# Patient Record
Sex: FEMALE | Race: WHITE | NOT HISPANIC OR LATINO | Employment: FULL TIME | URBAN - METROPOLITAN AREA
[De-identification: names, ages, dates, MRNs, and addresses within clinical notes are randomized per-mention and may not be internally consistent; named-entity substitution may affect disease eponyms.]

---

## 2017-05-27 ENCOUNTER — TRANSFERRED RECORDS (OUTPATIENT)
Dept: HEALTH INFORMATION MANAGEMENT | Facility: CLINIC | Age: 42
End: 2017-05-27

## 2017-09-18 ENCOUNTER — OFFICE VISIT (OUTPATIENT)
Dept: FAMILY MEDICINE | Facility: CLINIC | Age: 42
End: 2017-09-18
Payer: COMMERCIAL

## 2017-09-18 VITALS
BODY MASS INDEX: 23.98 KG/M2 | TEMPERATURE: 98.7 F | SYSTOLIC BLOOD PRESSURE: 128 MMHG | HEART RATE: 68 BPM | WEIGHT: 148.6 LBS | DIASTOLIC BLOOD PRESSURE: 78 MMHG | RESPIRATION RATE: 20 BRPM

## 2017-09-18 DIAGNOSIS — G43.909 MIGRAINE WITHOUT STATUS MIGRAINOSUS, NOT INTRACTABLE, UNSPECIFIED MIGRAINE TYPE: ICD-10-CM

## 2017-09-18 DIAGNOSIS — M54.12 CERVICAL RADICULAR PAIN: Primary | ICD-10-CM

## 2017-09-18 PROCEDURE — 99214 OFFICE O/P EST MOD 30 MIN: CPT | Performed by: FAMILY MEDICINE

## 2017-09-18 RX ORDER — PREDNISONE 20 MG/1
TABLET ORAL
Qty: 8 TABLET | Refills: 0 | Status: SHIPPED | OUTPATIENT
Start: 2017-09-18 | End: 2018-01-29

## 2017-09-18 RX ORDER — GABAPENTIN 100 MG/1
200 CAPSULE ORAL 3 TIMES DAILY
Refills: 0 | COMMUNITY
Start: 2017-08-31 | End: 2017-09-18

## 2017-09-18 RX ORDER — SUMATRIPTAN 50 MG/1
50 TABLET, FILM COATED ORAL
Qty: 30 TABLET | Refills: 3 | Status: SHIPPED | OUTPATIENT
Start: 2017-09-18 | End: 2018-01-24

## 2017-09-18 RX ORDER — SUMATRIPTAN 50 MG/1
50 TABLET, FILM COATED ORAL PRN
COMMUNITY
Start: 2016-08-25 | End: 2017-09-18

## 2017-09-18 RX ORDER — GABAPENTIN 400 MG/1
400 CAPSULE ORAL 3 TIMES DAILY
Qty: 90 CAPSULE | Refills: 1 | Status: SHIPPED | OUTPATIENT
Start: 2017-09-18 | End: 2017-11-22

## 2017-09-18 RX ORDER — HYDROXYZINE HYDROCHLORIDE 25 MG/1
TABLET, FILM COATED ORAL
Qty: 30 TABLET | Refills: 1 | Status: SHIPPED | OUTPATIENT
Start: 2017-09-18 | End: 2018-03-07

## 2017-09-18 NOTE — PROGRESS NOTES
SUBJECTIVE:   Ariadne Cooper is a 42 year old female who presents to clinic today for the following health issues:    Joint Pain    Onset: for a while    Description:   Location: Bilateral elbows down to hands   Character: Sharp and Dull ache    Intensity: moderate    Progression of Symptoms: worse    Accompanying Signs & Symptoms:  Other symptoms: numbness and tingling    History:   Previous similar pain: YES      Precipitating factors:   Trauma or overuse: no   She can't open doors or lift things without having pain.      Alleviating factors:  Improved by: nothing    Therapies Tried and outcome: Gabapentin has been helping until the last 2 weeks           Migraine Follow-Up    Headaches symptoms:  Worsened, is on day 3-4     Frequency:  Not too often     Duration of headaches: continuous    Able to do normal daily activities/work with migraines: No - has to modify daily activities    Rescue/Relief medication:None               Effectiveness: no relief    Preventative medication: none     Neurologic complications: No new stroke-like symptoms, loss of vision or speech, numbness or weakness    In the past 4 weeks, how often have you gone to Urgent Care or the emergency room because of your headaches?  0    Was given Imitrex though has used the last 3 days and not working, now Rx has .     S: Ariadne Coopre is a 42 year old female with chronic bilateral lower arm and hand pain, which she has been told by her spine surgeon at  [facility] comes from her neck.  One MRI within past 12 months, need to  Get record.  Non surgical, apparently.  On 200mg tid gabapentin.  Was working, but last few weeks arm/hand pain returned.  Apparently was worked up as carpal tunnel, normal EMG at wrist, then MRI neck with significant findings.    No new or recent injury to explain worsening of sx.     Migraines: neck pain has made worse.  Imitrex not working, was taking regularly over last few days, was unaware of mainly preventive  benefit.  On voltaren 50mg bid chronically for months as well    No fever, no trauma, no rash, no LE sx. Upper arms still sx free.     Problem list, med list, additional histories reviewed and updated, as indicated.      O:/78 (BP Location: Left arm, Cuff Size: Adult Regular)  Pulse 68  Temp 98.7  F (37.1  C) (Tympanic)  Resp 20  Wt 148 lb 9.6 oz (67.4 kg)  BMI 23.98 kg/m2  GEN: Alert and oriented, in no acute distress  Weaker hands, not severe.  Normal above elbows.  No sx with ROM neck.  Neck ok to palpation    A: migraine , persistent, worse       Bilateral cervical radiculopathy, worse    P: up to 400mg on gabapentin tid.  Add some vistaril prn for HA trx.  Prednisone for neck and migraine help.  Fills on imitrex, clarified how to use that.  See how all this goes.  Signed for records on neck from  ortho.      F/u depends on response.  Try to get her off chronic nsaids in future as well.  Probably not helping migraine status, and puts her at risk for complications GI/renal long term.

## 2017-09-18 NOTE — NURSING NOTE
"Chief Complaint   Patient presents with     Musculoskeletal Problem     arm and hand pain from neck discs      Headache       Initial /78 (BP Location: Left arm, Cuff Size: Adult Regular)  Pulse 68  Temp 98.7  F (37.1  C) (Tympanic)  Resp 20  Wt 148 lb 9.6 oz (67.4 kg)  BMI 23.98 kg/m2 Estimated body mass index is 23.98 kg/(m^2) as calculated from the following:    Height as of 11/9/16: 5' 6\" (1.676 m).    Weight as of this encounter: 148 lb 9.6 oz (67.4 kg).  Medication Reconciliation: complete    Health Maintenance that is potentially due pending provider review:  Pap Smear and tetanus.  Patient reported she had a flu shot at work 9/11/217.    Pt will schedule future appt.    Is there anyone who you would like to be able to receive your results? Not Applicable  If yes have patient fill out LORI  Radha Cm M.A.        "

## 2017-09-18 NOTE — MR AVS SNAPSHOT
"              After Visit Summary   2017    Ariadne Cooper    MRN: [id number]           Patient Information     Date Of Birth          1975        Visit Information        Provider Department      2017 4:20 PM Young Hoskins MD [facility]        Today's Diagnoses     Cervical radicular pain    -  1    Migraine without status migrainosus, not intractable, unspecified migraine type           Follow-ups after your visit        Who to contact     If you have questions or need follow up information about today's clinic visit or your schedule please contact [facility] directly at [phone].  Normal or non-critical lab and imaging results will be communicated to you by MyChart, letter or phone within 4 business days after the clinic has received the results. If you do not hear from us within 7 days, please contact the clinic through itembasehart or phone. If you have a critical or abnormal lab result, we will notify you by phone as soon as possible.  Submit refill requests through Music[employer] or call your pharmacy and they will forward the refill request to us. Please allow 3 business days for your refill to be completed.          Additional Information About Your Visit        MyChart Information     Musicane lets you send messages to your doctor, view your test results, renew your prescriptions, schedule appointments and more. To sign up, go to [email or web address] . Click on \"Log in\" on the left side of the screen, which will take you to the Welcome page. Then click on \"Sign up Now\" on the right side of the page.     You will be asked to enter the access code listed below, as well as some personal information. Please follow the directions to create your username and password.     Your access code is: [email or web address]  Expires: 2017  5:26 PM     Your access code will  in 90 days. If you need help or a new code, please call your [facility] or [phone].      "   Care EveryWhere ID     This is your Care EveryWhere ID. This could be used by other organizations to access your [place] medical records  [id number]        Your Vitals Were     Pulse Temperature Respirations BMI (Body Mass Index)          68 98.7  F (37.1  C) (Tympanic) 20 23.98 kg/m2         Blood Pressure from Last 3 Encounters:   09/18/17 128/78   11/09/16 132/88    Weight from Last 3 Encounters:   09/18/17 148 lb 9.6 oz (67.4 kg)   11/09/16 140 lb (63.5 kg)              Today, you had the following     No orders found for display         Today's Medication Changes          These changes are accurate as of: 9/18/17  5:26 PM.  If you have any questions, ask your nurse or doctor.               Start taking these medicines.        Dose/Directions    hydrOXYzine 25 MG tablet   Commonly known as:  ATARAX   Used for:  Migraine without status migrainosus, not intractable, unspecified migraine type   Started by:  Young Hoskins MD        1-2 tabs 3x/day for migraine as needed   Quantity:  30 tablet   Refills:  1       predniSONE 20 MG tablet   Commonly known as:  DELTASONE   Used for:  Cervical radicular pain, Migraine without status migrainosus, not intractable, unspecified migraine type   Started by:  Young Hoskins MD        2 daily for 3 days, one daily for 2 days then stop   Quantity:  8 tablet   Refills:  0         These medicines have changed or have updated prescriptions.        Dose/Directions    gabapentin 400 MG capsule   Commonly known as:  NEURONTIN   This may have changed:    - medication strength  - how much to take   Used for:  Cervical radicular pain   Changed by:  Young Hoskins MD        Dose:  400 mg   Take 1 capsule (400 mg) by mouth 3 times daily   Quantity:  90 capsule   Refills:  1       SUMAtriptan 50 MG tablet   Commonly known as:  IMITREX   This may have changed:    - when to take this  - additional instructions   Used for:  Migraine without status migrainosus, not intractable,  unspecified migraine type   Changed by:  Young Hoskins MD        Dose:  50 mg   Take 1 tablet (50 mg) by mouth at onset of headache May repeat in 1 hour if needed   Quantity:  30 tablet   Refills:  3            Where to get your medicines      These medications were sent to [facility] [address] [place], [place] - [address] [facility] AT [facility] [address]  [address] [facility] [address]     Phone:  [phone]     gabapentin 400 MG capsule    hydrOXYzine 25 MG tablet    predniSONE 20 MG tablet    SUMAtriptan 50 MG tablet                Primary Care Provider Office Phone # Fax #    Young Hoskins [phone] [address] [facility] [address]        Equal Access to Services     THANG SIMON : Hadii victorina zuleta hadasho Soomaali, waaxda luqadaha, qaybta kaalmada adeegyada, waxay idiin hayradhan paco puckett. So [facility] [phone].    ATENCIÓN: Si habla español, tiene a mckenzie disposición servicios gratuitos de asistencia lingüística. [facility] [phone].    We comply with applicable federal civil rights laws and [place] laws. We do not discriminate on the basis of race, color, national origin, age, disability sex, sexual orientation or gender identity.            Thank you!     Thank you for choosing [facility]  for your care. Our goal is always to provide you with excellent care. Hearing back from our patients is one way we can continue to improve our services. Please take a few minutes to complete the written survey that you may receive in the mail after your visit with us. Thank you!             Your Updated Medication List - Protect others around you: Learn how to safely use, store and throw away your medicines at www.disposemymeds.org.          This list is accurate as of: 9/18/17  5:26 PM.  Always use your most recent med list.                   Brand Name Dispense Instructions for use Diagnosis    diclofenac 50 MG EC tablet    VOLTAREN      Take 50 mg by mouth 2 times daily        gabapentin 400 MG capsule    NEURONTIN    90 capsule    Take 1 capsule (400 mg) by mouth 3 times daily    Cervical radicular pain       hydrOXYzine 25 MG tablet    ATARAX    30 tablet    1-2 tabs 3x/day for migraine as needed    Migraine without status migrainosus, not intractable, unspecified migraine type       NORCO 5-325 MG per tablet   Generic drug:  HYDROcodone-acetaminophen      Take 1 tablet by mouth every 6 hours as needed for moderate to severe pain    Throat pain       predniSONE 20 MG tablet    DELTASONE    8 tablet    2 daily for 3 days, one daily for 2 days then stop    Cervical radicular pain, Migraine without status migrainosus, not intractable, unspecified migraine type       SUMAtriptan 50 MG tablet    IMITREX    30 tablet    Take 1 tablet (50 mg) by mouth at onset of headache May repeat in 1 hour if needed    Migraine without status migrainosus, not intractable, unspecified migraine type

## 2017-11-22 DIAGNOSIS — M54.12 CERVICAL RADICULAR PAIN: ICD-10-CM

## 2017-11-22 RX ORDER — GABAPENTIN 400 MG/1
400 CAPSULE ORAL 3 TIMES DAILY
Qty: 90 CAPSULE | Refills: 11 | Status: SHIPPED | OUTPATIENT
Start: 2017-11-22 | End: 2018-01-29 | Stop reason: DRUGHIGH

## 2017-11-22 NOTE — TELEPHONE ENCOUNTER
Gabapentin 400mg      Last Written Prescription Date: 09.18.2017  Last Fill Quantity: 90,  # refills: 1   Last Office Visit with G, P or [facility] prescribing provider: 09.18.2017    Yaritza Olivia CSS

## 2017-12-03 ENCOUNTER — HEALTH MAINTENANCE LETTER (OUTPATIENT)
Age: 42
End: 2017-12-03

## 2018-01-24 DIAGNOSIS — G43.909 MIGRAINE WITHOUT STATUS MIGRAINOSUS, NOT INTRACTABLE, UNSPECIFIED MIGRAINE TYPE: ICD-10-CM

## 2018-01-24 RX ORDER — SUMATRIPTAN 50 MG/1
50 TABLET, FILM COATED ORAL
Qty: 18 TABLET | Refills: 3 | Status: SHIPPED | OUTPATIENT
Start: 2018-01-24 | End: 2018-05-03

## 2018-01-24 NOTE — TELEPHONE ENCOUNTER
Please put note to pharmacy on Rx that we have her last name as Kenneth, Pharmacy request was under the name of Ariadne Lowry.    Requested Prescriptions   Pending Prescriptions Disp Refills     SUMAtriptan (IMITREX) 50 MG tablet 30 tablet 3     Sig: Take 1 tablet (50 mg) by mouth at onset of headache May repeat in 1 hour if needed    There is no refill protocol information for this order        Last Written Prescription Date:  9/18/17  Last Fill Quantity: 30,  # refills: 3   Last Office Visit with [facility], [facility] or [facility] prescribing provider:  9/18/17   Future Office Visit:

## 2018-01-29 ENCOUNTER — OFFICE VISIT (OUTPATIENT)
Dept: FAMILY MEDICINE | Facility: CLINIC | Age: 43
End: 2018-01-29
Payer: COMMERCIAL

## 2018-01-29 VITALS
BODY MASS INDEX: 24.64 KG/M2 | WEIGHT: 157 LBS | TEMPERATURE: 98.4 F | DIASTOLIC BLOOD PRESSURE: 76 MMHG | HEIGHT: 67 IN | SYSTOLIC BLOOD PRESSURE: 134 MMHG | HEART RATE: 85 BPM

## 2018-01-29 DIAGNOSIS — G62.9 NEUROPATHY: ICD-10-CM

## 2018-01-29 DIAGNOSIS — M54.12 CERVICAL RADICULAR PAIN: Primary | ICD-10-CM

## 2018-01-29 PROCEDURE — 36415 COLL VENOUS BLD VENIPUNCTURE: CPT | Performed by: NURSE PRACTITIONER

## 2018-01-29 PROCEDURE — 80053 COMPREHEN METABOLIC PANEL: CPT | Performed by: NURSE PRACTITIONER

## 2018-01-29 PROCEDURE — 99214 OFFICE O/P EST MOD 30 MIN: CPT | Performed by: NURSE PRACTITIONER

## 2018-01-29 RX ORDER — TRAMADOL HYDROCHLORIDE 50 MG/1
50-100 TABLET ORAL EVERY 6 HOURS PRN
Qty: 30 TABLET | Refills: 0 | Status: SHIPPED | OUTPATIENT
Start: 2018-01-29 | End: 2018-02-14

## 2018-01-29 RX ORDER — GABAPENTIN 300 MG/1
600 CAPSULE ORAL 3 TIMES DAILY
Qty: 60 CAPSULE | Refills: 3 | Status: SHIPPED | OUTPATIENT
Start: 2018-01-29 | End: 2018-03-12

## 2018-01-29 ASSESSMENT — PAIN SCALES - GENERAL: PAINLEVEL: SEVERE PAIN (6)

## 2018-01-29 NOTE — MR AVS SNAPSHOT
After Visit Summary   1/29/2018    Araidne Lowry    MRN: [id number]           Patient Information     Date Of Birth          1975        Visit Information        Provider Department      1/29/2018 4:40 PM Emily Campoverde APRN [facility]        Today's Diagnoses     Cervical radicular pain    -  1    Neuropathy          Care Instructions    Increase your Gabapentin to 600 mg three times daily.    Ultram is ordered  mg every 6 hours a day, maximum of 4 tablets per day    Referrals are in place for MRI and Orthopedics          Follow-ups after your visit        Additional Services     ORTHO  REFERRAL       [facility] is referring you to the Orthopedic  Services at [facility].       The  Representative will assist you in the coordination of your Orthopedic and Musculoskeletal Care as prescribed by your physician.    The  Representative will call you within 1 business day to help schedule your appointment, or you may contact the  Representative at:    All areas ~ [phone] Patient would like [facility] Referral     Type of Referral : Spine: Cervical / Thoracic: Cervical / Thoracic Spine Surgeon        Timeframe requested: after MRI    Coverage of these services is subject to the terms and limitations of your health insurance plan.  Please call member services at your health plan with any benefit or coverage questions.      If X-rays, CT or MRI's have been performed, please contact the facility where they were done to arrange for , prior to your scheduled appointment.  Please bring this referral request to your appointment and present it to your specialist.                  Future tests that were ordered for you today     Open Future Orders        Priority Expected Expires Ordered    MR Cervical Spine w/o Contrast Routine  1/29/2019 1/29/2018            Who to contact     If you  "have questions or need follow up information about today's clinic visit or your schedule please contact [facility] directly at [phone].  Normal or non-critical lab and imaging results will be communicated to you by MyChart, letter or phone within 4 business days after the clinic has received the results. If you do not hear from us within 7 days, please contact the clinic through [employer] or phone. If you have a critical or abnormal lab result, we will notify you by phone as soon as possible.  Submit refill requests through Deskarma or call your pharmacy and they will forward the refill request to us. Please allow 3 business days for your refill to be completed.          Additional Information About Your Visit        [employer] Information     Deskarma lets you send messages to your doctor, view your test results, renew your prescriptions, schedule appointments and more. To sign up, go to [email or web address] . Click on \"Log in\" on the left side of the screen, which will take you to the Welcome page. Then click on \"Sign up Now\" on the right side of the page.     You will be asked to enter the access code listed below, as well as some personal information. Please follow the directions to create your username and password.     Your access code is: [email or web address]  Expires: 2018  5:58 PM     Your access code will  in 90 days. If you need help or a new code, please call your [place] clinic or [phone].        Care EveryWhere ID     This is your Care EveryWhere ID. This could be used by other organizations to access your [place] medical records  [id number]        Your Vitals Were     Pulse Temperature Height Last Period Breastfeeding? BMI (Body Mass Index)    85 98.4  F (36.9  C) (Tympanic) 5' 6.75\" (1.695 m) 01/15/2018 No 24.77 kg/m2       Blood Pressure from Last 3 Encounters:   18 134/76   17 128/78   16 132/88    Weight from Last 3 Encounters:   18 157 lb (71.2 " kg)   09/18/17 148 lb 9.6 oz (67.4 kg)   11/09/16 140 lb (63.5 kg)              We Performed the Following     ORTHO  REFERRAL          Today's Medication Changes          These changes are accurate as of 1/29/18  5:58 PM.  If you have any questions, ask your nurse or doctor.               Start taking these medicines.        Dose/Directions    traMADol 50 MG tablet   Commonly known as:  ULTRAM   Used for:  Cervical radicular pain   Started by:  Emily Camopverde APRN CNP        Dose:   mg   Take 1-2 tablets ( mg) by mouth every 6 hours as needed for pain maximum 4 tablet(s) per day   Quantity:  30 tablet   Refills:  0         These medicines have changed or have updated prescriptions.        Dose/Directions    * gabapentin 400 MG capsule   Commonly known as:  NEURONTIN   This may have changed:  Another medication with the same name was added. Make sure you understand how and when to take each.   Used for:  Cervical radicular pain   Changed by:  Emily Campoverde APRN CNP        Dose:  400 mg   Take 1 capsule (400 mg) by mouth 3 times daily   Quantity:  90 capsule   Refills:  11       * gabapentin 300 MG capsule   Commonly known as:  NEURONTIN   This may have changed:  You were already taking a medication with the same name, and this prescription was added. Make sure you understand how and when to take each.   Used for:  Neuropathy   Changed by:  Emily Campoverde APRN CNP        Dose:  600 mg   Take 2 capsules (600 mg) by mouth 3 times daily   Quantity:  60 capsule   Refills:  3       * Notice:  This list has 2 medication(s) that are the same as other medications prescribed for you. Read the directions carefully, and ask your doctor or other care provider to review them with you.         Where to get your medicines      These medications were sent to [facility] [address] [place] [address] - [address] [facility] AT [facility] & [address]  [address] [place] [address]      Phone:  [phone]     gabapentin 300 MG capsule         Some of these will need a paper prescription and others can be bought over the counter.  Ask your nurse if you have questions.     Bring a paper prescription for each of these medications     traMADol 50 MG tablet                Primary Care Provider Office Phone # Fax #    Young Hoskins -[phone]       [address] [facility] [address]        Equal Access to Services     THANG SIMON : Hadii aad ku hadasho Soomaali, waaxda luqadaha, qaybta kaalmada adeegyada, waxay idiin hayaan adeeg maykel ladominique . So [facility] [phone].    ATENCIÓN: Si billla espsohan, tiene a mckenzie disposición servicios gratuitos de asistencia lingüística. [facility] al [phone].    We comply with applicable federal civil rights laws and [place] laws. We do not discriminate on the basis of race, color, national origin, age, disability, sex, sexual orientation, or gender identity.            Thank you!     Thank you for choosing [facility]  for your care. Our goal is always to provide you with excellent care. Hearing back from our patients is one way we can continue to improve our services. Please take a few minutes to complete the written survey that you may receive in the mail after your visit with us. Thank you!             Your Updated Medication List - Protect others around you: Learn how to safely use, store and throw away your medicines at www.disposemymeds.org.          This list is accurate as of 1/29/18  5:58 PM.  Always use your most recent med list.                   Brand Name Dispense Instructions for use Diagnosis    * gabapentin 400 MG capsule    NEURONTIN    90 capsule    Take 1 capsule (400 mg) by mouth 3 times daily    Cervical radicular pain       * gabapentin 300 MG capsule    NEURONTIN    60 capsule    Take 2 capsules (600 mg) by mouth 3 times daily    Neuropathy       hydrOXYzine 25 MG tablet    ATARAX    30 tablet     1-2 tabs 3x/day for migraine as needed    Migraine without status migrainosus, not intractable, unspecified migraine type       NORCO 5-325 MG per tablet   Generic drug:  HYDROcodone-acetaminophen      Take 1 tablet by mouth every 6 hours as needed for moderate to severe pain    Throat pain       SUMAtriptan 50 MG tablet    IMITREX    18 tablet    Take 1 tablet (50 mg) by mouth at onset of headache May repeat in 1 hour if needed    Migraine without status migrainosus, not intractable, unspecified migraine type       traMADol 50 MG tablet    ULTRAM    30 tablet    Take 1-2 tablets ( mg) by mouth every 6 hours as needed for pain maximum 4 tablet(s) per day    Cervical radicular pain       * Notice:  This list has 2 medication(s) that are the same as other medications prescribed for you. Read the directions carefully, and ask your doctor or other care provider to review them with you.

## 2018-01-29 NOTE — LETTER
February 1, 2018      Ariadne Lowry  [address] [facility] [address]        Dear ,    We are writing to inform you of your test results.    Your test results fall within the expected range(s) or remain unchanged from previous results.  Please continue with current treatment plan.    Resulted Orders   Comprehensive metabolic panel (BMP + Alb, Alk Phos, ALT, AST, Total. Bili, TP)   Result Value Ref Range    Sodium 134 133 - 144 mmol/L    Potassium 4.8 3.4 - 5.3 mmol/L    Chloride 104 94 - 109 mmol/L    Carbon Dioxide 25 20 - 32 mmol/L    Anion Gap 5 3 - 14 mmol/L    Glucose 93 70 - 99 mg/dL    Urea Nitrogen 20 7 - 30 mg/dL    Creatinine 0.77 0.52 - 1.04 mg/dL    GFR Estimate 82 >60 mL/min/1.7m2      Comment:      Non  GFR Calc    GFR Estimate If Black >90 >60 mL/min/1.7m2      Comment:       GFR Calc    Calcium 8.7 8.5 - 10.1 mg/dL    Bilirubin Total 0.2 0.2 - 1.3 mg/dL    Albumin 4.1 3.4 - 5.0 g/dL    Protein Total 7.9 6.8 - 8.8 g/dL    Alkaline Phosphatase 95 40 - 150 U/L    ALT 24 0 - 50 U/L    AST 17 0 - 45 U/L                 If you have any questions or concerns, please call the clinic at the number listed above.       Sincerely,        Emily Campoverde, NP, APRN CNP/dw

## 2018-01-29 NOTE — PROGRESS NOTES
SUBJECTIVE:   Ariadne Lowry is a 42 year old female who presents to clinic today for the following health issues:      Neck Pain      Duration: 1.5 years    Description:  Location: neck  Radiation: Down arms into the right hand and left hand    Intensity:  moderate    Accompanying signs and symptoms: shoulder pain and migraines    Dropping objects    History (similar episodes/previous evaluation): MRI done at [facility] 14 months ago, taking gabapentin for. Ortho specialist Dr. Parikh Cheyenne has retired and patient states needs new MRI before being seen by a new orthopedic specialist.  Dr. parikh has been managing her pain medications-has been on Norco     precipitating or alleviating factors: None  Therapies tried and outcome: Gabapentin increased dose last visit but pain seems to be increased, patient is out of Norco for 1 week.     Pain in hands and arms bilaterally and migraines.  Pain now getting worse, even needs assist with showering.   Can't hold plate of food  Gripping hands hurts more than not.  Gabapentin was increased to 400 mg tid.  She does not think that this is me much of a difference.  Still with pain.  Patient is asking me to fill a prescription for Norco.  When I explained that I would not prescribe Norco (this is the first time I've met her and outside provider has been prescribing) she and her  became quite irritated with me.  I did tell them I would be willing to provide 30 tablets of tramadol    Reports cervical stenosis at the following levels: c4-5, c5-6 and c6-7        Problem list and histories reviewed & adjusted, as indicated.  Additional history: as documented    Patient Active Problem List   Diagnosis     Cervical radicular pain     Migraine without status migrainosus, not intractable, unspecified migraine type     No past surgical history on file.    Social History   Substance Use Topics     Smoking status: Former Smoker     Quit date: 11/9/2004     Smokeless  "tobacco: Never Used     Alcohol use No     Family History   Problem Relation Age of Onset     Seasonal/Environmental Allergies Mother      Thyroid Disease Mother      HEART DISEASE Father      Neurologic Disorder Father      HEART DISEASE Maternal Grandfather      Alcoholism Paternal Grandfather      Depression Daughter      Psychotic Disorder Daughter          Current Outpatient Prescriptions   Medication Sig Dispense Refill     gabapentin (NEURONTIN) 300 MG capsule Take 2 capsules (600 mg) by mouth 3 times daily 60 capsule 3     traMADol (ULTRAM) 50 MG tablet Take 1-2 tablets ( mg) by mouth every 6 hours as needed for pain maximum 4 tablet(s) per day 30 tablet 0     SUMAtriptan (IMITREX) 50 MG tablet Take 1 tablet (50 mg) by mouth at onset of headache May repeat in 1 hour if needed 18 tablet 3     hydrOXYzine (ATARAX) 25 MG tablet 1-2 tabs 3x/day for migraine as needed 30 tablet 1     HYDROcodone-acetaminophen (NORCO) 5-325 MG per tablet Take 1 tablet by mouth every 6 hours as needed for moderate to severe pain         Reviewed and updated as needed this visit by clinical staff  Tobacco  Allergies  Meds  Problems       Reviewed and updated as needed this visit by Provider  Allergies  Meds  Problems         ROS:  Constitutional, HEENT, cardiovascular, pulmonary, GI, , musculoskeletal, neuro, skin, endocrine and psych systems are negative, except as otherwise noted.    OBJECTIVE:     /76  Pulse 85  Temp 98.4  F (36.9  C) (Tympanic)  Ht 5' 6.75\" (1.695 m)  Wt 157 lb (71.2 kg)  LMP 01/15/2018  Breastfeeding? No  BMI 24.77 kg/m2  Body mass index is 24.77 kg/(m^2).  GENERAL: healthy, alert and no distress  EYES: Eyes grossly normal to inspection and conjunctivae and sclerae normal  HENT: ear canals and TM's normal, nose and mouth without ulcers or lesions  NECK: no adenopathy, no asymmetry, masses, or scars and thyroid normal to palpation  RESP: lungs clear to auscultation - no rales, rhonchi " or wheezes  CV: regular rate and rhythm, normal S1 S2, no S3 or S4, no murmur, click or rub, no peripheral edema and peripheral pulses strong  MS: no gross musculoskeletal defects noted, no edema  Comprehensive back pain exam:  Tenderness of para cervical spine with palpation and Pain limits the following motions: Flexion, extension and rotation of head and neck    Diagnostic Test Results:  none     ASSESSMENT/PLAN:     ASSESSMENT:  1. Cervical radicular pain.  Will increase her gabapentin to 600 mg 3 times a day.  Limited supply of tramadol 50 mg.  Patient is requesting outside referral to [facility] or so to see in the spine specialist.  Patient needs to have updated MRI scan done.   2. Neuropathy.  Persists and not optimally managed.  Increase gabapentin       PLAN:  Orders Placed This Encounter     MR Cervical Spine w/o Contrast     Comprehensive metabolic panel (BMP + Alb, Alk Phos, ALT, AST, Total. Bili, TP)     ORTHO  REFERRAL     gabapentin (NEURONTIN) 300 MG capsule     traMADol (ULTRAM) 50 MG tablet       Patient Instructions   Increase your Gabapentin to 600 mg three times daily.    Ultram is ordered  mg every 6 hours a day, maximum of 4 tablets per day    Referrals are in place for MRI and Orthopedics    Patient agrees with plan of care as outlined    Chart documentation with Dragon voice recognition software. Although reviewed after completion, some words and grammatical errors may remain    TT spent: 30 minutes of which 30 minutes were spent in direct face to face contact with patient/family. Patient teaching done regarding: Opioids and nonmedicinal symptom management. Greater than 50% of time spent counseling and/or coordinating care.         Emily Campoverde, NP, APRN CNP  [facility]

## 2018-01-29 NOTE — PATIENT INSTRUCTIONS
Increase your Gabapentin to 600 mg three times daily.    Ultram is ordered  mg every 6 hours a day, maximum of 4 tablets per day    Referrals are in place for MRI and Orthopedics

## 2018-01-30 LAB
ALBUMIN SERPL-MCNC: 4.1 G/DL (ref 3.4–5)
ALP SERPL-CCNC: 95 U/L (ref 40–150)
ALT SERPL W P-5'-P-CCNC: 24 U/L (ref 0–50)
ANION GAP SERPL CALCULATED.3IONS-SCNC: 5 MMOL/L (ref 3–14)
AST SERPL W P-5'-P-CCNC: 17 U/L (ref 0–45)
BILIRUB SERPL-MCNC: 0.2 MG/DL (ref 0.2–1.3)
BUN SERPL-MCNC: 20 MG/DL (ref 7–30)
CALCIUM SERPL-MCNC: 8.7 MG/DL (ref 8.5–10.1)
CHLORIDE SERPL-SCNC: 104 MMOL/L (ref 94–109)
CO2 SERPL-SCNC: 25 MMOL/L (ref 20–32)
CREAT SERPL-MCNC: 0.77 MG/DL (ref 0.52–1.04)
GFR SERPL CREATININE-BSD FRML MDRD: 82 ML/MIN/1.7M2
GLUCOSE SERPL-MCNC: 93 MG/DL (ref 70–99)
POTASSIUM SERPL-SCNC: 4.8 MMOL/L (ref 3.4–5.3)
PROT SERPL-MCNC: 7.9 G/DL (ref 6.8–8.8)
SODIUM SERPL-SCNC: 134 MMOL/L (ref 133–144)

## 2018-02-12 ENCOUNTER — TRANSFERRED RECORDS (OUTPATIENT)
Dept: HEALTH INFORMATION MANAGEMENT | Facility: CLINIC | Age: 43
End: 2018-02-12

## 2018-02-14 DIAGNOSIS — M54.12 CERVICAL RADICULAR PAIN: ICD-10-CM

## 2018-02-14 RX ORDER — TRAMADOL HYDROCHLORIDE 50 MG/1
TABLET ORAL
Qty: 30 TABLET | Refills: 0 | Status: SHIPPED | OUTPATIENT
Start: 2018-02-14 | End: 2018-03-07

## 2018-02-14 NOTE — TELEPHONE ENCOUNTER
traMADol (ULTRAM) 50 MG tablet      Last Written Prescription Date:  1/29/18  Last Fill Quantity: 30,   # refills: 0  Last Office Visit: 1/29/18  Future Office visit:       Routing refill request to provider for review/approval because:  Drug not on the [facility], [facility] or [facility] refill protocol or controlled substance

## 2018-02-15 RX ORDER — TRAMADOL HYDROCHLORIDE 50 MG/1
TABLET ORAL
Qty: 30 TABLET | Refills: 0 | OUTPATIENT
Start: 2018-02-15

## 2018-02-20 ENCOUNTER — TRANSFERRED RECORDS (OUTPATIENT)
Dept: HEALTH INFORMATION MANAGEMENT | Facility: CLINIC | Age: 43
End: 2018-02-20

## 2018-03-07 ENCOUNTER — OFFICE VISIT (OUTPATIENT)
Dept: FAMILY MEDICINE | Facility: CLINIC | Age: 43
End: 2018-03-07
Payer: COMMERCIAL

## 2018-03-07 VITALS
WEIGHT: 153 LBS | DIASTOLIC BLOOD PRESSURE: 78 MMHG | SYSTOLIC BLOOD PRESSURE: 128 MMHG | HEIGHT: 67 IN | TEMPERATURE: 98.3 F | OXYGEN SATURATION: 99 % | HEART RATE: 78 BPM | BODY MASS INDEX: 24.01 KG/M2

## 2018-03-07 DIAGNOSIS — G43.909 MIGRAINE WITHOUT STATUS MIGRAINOSUS, NOT INTRACTABLE, UNSPECIFIED MIGRAINE TYPE: ICD-10-CM

## 2018-03-07 DIAGNOSIS — M79.644 PAIN IN FINGER OF BOTH HANDS: Primary | ICD-10-CM

## 2018-03-07 DIAGNOSIS — M79.645 PAIN IN FINGER OF BOTH HANDS: Primary | ICD-10-CM

## 2018-03-07 DIAGNOSIS — F43.21 ADJUSTMENT DISORDER WITH DEPRESSED MOOD: ICD-10-CM

## 2018-03-07 PROBLEM — G56.03 BILATERAL CARPAL TUNNEL SYNDROME: Status: ACTIVE | Noted: 2018-03-07

## 2018-03-07 PROCEDURE — 99214 OFFICE O/P EST MOD 30 MIN: CPT | Performed by: FAMILY MEDICINE

## 2018-03-07 PROCEDURE — 36415 COLL VENOUS BLD VENIPUNCTURE: CPT | Performed by: FAMILY MEDICINE

## 2018-03-07 PROCEDURE — 84443 ASSAY THYROID STIM HORMONE: CPT | Performed by: FAMILY MEDICINE

## 2018-03-07 RX ORDER — SUMATRIPTAN 100 MG/1
100 TABLET, FILM COATED ORAL
Qty: 12 TABLET | Refills: 11 | Status: SHIPPED | OUTPATIENT
Start: 2018-03-07 | End: 2019-03-18

## 2018-03-07 RX ORDER — HYDROCODONE BITARTRATE AND ACETAMINOPHEN 5; 325 MG/1; MG/1
TABLET ORAL
Qty: 30 TABLET | Refills: 0 | Status: SHIPPED | OUTPATIENT
Start: 2018-03-07 | End: 2018-04-05

## 2018-03-07 NOTE — PROGRESS NOTES
"  SUBJECTIVE:   Ariadne Lowry is a 42 year old female who presents to clinic today for the following health issues:     Referrl - Pain clinic       Description (location/character/radiation): Patient is here to get a referral to pain management. She saw the specialist and that was what they requested.      S: Ariadne Lowry is a 42 year old female with chronic pain of her wrists and hands bilaterally.  This is been over a year.  Initially was thought to be carpal tunnel but was EMG negative.  Ended up with an MRI of her neck which showed some bulges at multiple levels and some C6 left nerve root impingement.  However her symptoms are bilateral.  Spinal surgery does not feel her hand wrist and arm symptoms necessarily correlate to any MRI findings in her neck.  They recommended pain clinic.    She does give additional history that both sides of her family have had carpal tunnel surgery she is wondering if the EMG test is giving us all the information we need.    She has migraine headaches which respond to Imitrex.  She has been on gabapentin for her hand and wrist pain.  This is getting worse she has trouble lifting things trouble reading holding a book trouble driving.  She feels like she cannot hold heavy things or pulled him out of the .  Is causing a lot of distress for her and she is becoming depressed and anxious about it.    Was given some tramadol to help with pain says it helps a little has a few tabs left and was given 30 month ago.    Has not seen neurology.  Has had minimal lab work.    Past medical history meds and problem list reviewed as appropriate    Objective:./78 (BP Location: Left arm, Patient Position: Chair, Cuff Size: Adult Regular)  Pulse 78  Temp 98.3  F (36.8  C) (Tympanic)  Ht 5' 6.75\" (1.695 m)  Wt 153 lb (69.4 kg)  SpO2 99%  Breastfeeding? No  BMI 24.14 kg/m2  GEN: Alert and oriented, in no acute distress  Arms/hand with normal strength. No atrophy.      A: hand, arm " pain, worsening, etiology unclear       Migraine headaches, worse        Adjustment disorder, new      P: start some zoloft.  Fills on imitrex.  Avoid tramadol given serotonin issues as possibility.  Gave 30 norco for short term use.    Neurology is what she needs.  Could be EMG negative carpal tunnel certainly.  Referral given   Will check thyroid as well.     I'm not ready to throw up our hands and send her to pain clinic, needs more investigation first.  She and  agree.     Not sure what long term plan is on pain medication at this point.  Told her that if we don't get an answer for her pain, and it's bad enough she feels she needs opioids, we would need to get pain clinic involved.

## 2018-03-07 NOTE — LETTER
March 8, 2018      Ariadne Lowry  [address] [facility] [address]        Dear ,    We are writing to inform you of your test results.    Your test results fall within the expected range(s) or remain unchanged from previous results.  Please continue with current treatment plan.  Thyroid test was normal.     I hope the neurology appointment is helpful in figuring things out.      Resulted Orders   TSH with free T4 reflex   Result Value Ref Range    TSH 3.11 0.40 - 4.00 mU/L       If you have any questions or concerns, please call the clinic at the number listed above.       Sincerely,        Young Hoskins MD/gianluca

## 2018-03-07 NOTE — MR AVS SNAPSHOT
After Visit Summary   3/7/2018    Ariadne Lowry    MRN: [id number]           Patient Information     Date Of Birth          1975        Visit Information        Provider Department      3/7/2018 4:20 PM Young Hoskins MD [facility][note 3]        Today's Diagnoses     Pain in finger of both hands    -  1    Adjustment disorder with depressed mood        Migraine without status migrainosus, not intractable, unspecified migraine type           Follow-ups after your visit        Additional Services     NEUROLOGY ADULT REFERRAL       Your provider has referred you for the following:   [place] [facility] [place] [facility] [phone]   [email or web address]    Please be aware that coverage of these services is subject to the terms and limitations of your health insurance plan.  Call member services at your health plan with any benefit or coverage questions.      Please bring the following with you to your appointment:    (1) Any X-Rays, CTs or MRIs which have been performed.  Contact the facility where they were done to arrange for  prior to your scheduled appointment.    (2) List of current medications  (3) This referral request   (4) Any documents/labs given to you for this referral                  Who to contact     If you have questions or need follow up information about today's clinic visit or your schedule please contact [facility] directly at [phone].  Normal or non-critical lab and imaging results will be communicated to you by MyChart, letter or phone within 4 business days after the clinic has received the results. If you do not hear from us within 7 days, please contact the clinic through MyChart or phone. If you have a critical or abnormal lab result, we will notify you by phone as soon as possible.  Submit refill requests through [employer] or call your pharmacy and they will forward the refill request to us. Please  "allow 3 business days for your refill to be completed.          Additional Information About Your Visit        MyChart Information     [employer] lets you send messages to your doctor, view your test results, renew your prescriptions, schedule appointments and more. To sign up, go to [email or web address] . Click on \"Log in\" on the left side of the screen, which will take you to the Welcome page. Then click on \"Sign up Now\" on the right side of the page.     You will be asked to enter the access code listed below, as well as some personal information. Please follow the directions to create your username and password.     Your access code is: [email or web address]  Expires: 2018  5:58 PM     Your access code will  in 90 days. If you need help or a new code, please call your [facility] clinic or [phone].        Care EveryWhere ID     This is your Care EveryWhere ID. This could be used by other organizations to access your [facility] medical records  [id number]        Your Vitals Were     Pulse Temperature Height Pulse Oximetry Breastfeeding? BMI (Body Mass Index)    78 98.3  F (36.8  C) (Tympanic) 5' 6.75\" (1.695 m) 99% No 24.14 kg/m2       Blood Pressure from Last 3 Encounters:   18 128/78   18 134/76   17 128/78    Weight from Last 3 Encounters:   18 153 lb (69.4 kg)   18 157 lb (71.2 kg)   17 148 lb 9.6 oz (67.4 kg)              We Performed the Following     NEUROLOGY ADULT REFERRAL     TSH with free T4 reflex          Today's Medication Changes          These changes are accurate as of 3/7/18  5:05 PM.  If you have any questions, ask your nurse or doctor.               Start taking these medicines.        Dose/Directions    HYDROcodone-acetaminophen 5-325 MG per tablet   Commonly known as:  NORCO   Used for:  Pain in finger of both hands   Started by:  Young Hoskins MD        1 tab bid prn pain   Quantity:  30 tablet   Refills:  0       sertraline 50 MG tablet   Commonly " known as:  ZOLOFT   Used for:  Adjustment disorder with depressed mood   Started by:  Young Hoskins MD        Dose:  50 mg   Take 1 tablet (50 mg) by mouth daily   Quantity:  30 tablet   Refills:  1         These medicines have changed or have updated prescriptions.        Dose/Directions    * SUMAtriptan 50 MG tablet   Commonly known as:  IMITREX   This may have changed:  Another medication with the same name was added. Make sure you understand how and when to take each.   Used for:  Migraine without status migrainosus, not intractable, unspecified migraine type   Changed by:  Young Hoskins MD        Dose:  50 mg   Take 1 tablet (50 mg) by mouth at onset of headache May repeat in 1 hour if needed   Quantity:  18 tablet   Refills:  3       * SUMAtriptan 100 MG tablet   Commonly known as:  IMITREX   This may have changed:  You were already taking a medication with the same name, and this prescription was added. Make sure you understand how and when to take each.   Used for:  Migraine without status migrainosus, not intractable, unspecified migraine type   Changed by:  Young Hoskins MD        Dose:  100 mg   Take 1 tablet (100 mg) by mouth at onset of headache for migraine May repeat in 2 hours. Max 2 tablets/24 hours.   Quantity:  12 tablet   Refills:  11       * Notice:  This list has 2 medication(s) that are the same as other medications prescribed for you. Read the directions carefully, and ask your doctor or other care provider to review them with you.      Stop taking these medicines if you haven't already. Please contact your care team if you have questions.     traMADol 50 MG tablet   Commonly known as:  ULTRAM   Stopped by:  Young Hoskins MD                Where to get your medicines      These medications were sent to [facility] [address] [place] [address] AT [facility] [address]  [address] [facility] [address]     Phone:  [phone]     sertraline 50 MG  tablet    SUMAtriptan 100 MG tablet         Some of these will need a paper prescription and others can be bought over the counter.  Ask your nurse if you have questions.     Bring a paper prescription for each of these medications     HYDROcodone-acetaminophen 5-325 MG per tablet                Primary Care Provider Office Phone # Fax #    Young Hoskins [phone]       [address] [facility] [address]        Equal Access to Services     THANG SIMON : Hadii aad ku hadasho Soomaali, waaxda luqadaha, qaybta kaalmada adeegyada, waxay idiin hayaan adeeg maykel laobedjesus alberto . So [facility] [phone].    ATENCIÓN: Si habla espsohan, tiene a mckenzie disposición servicios gratuitos de asistencia lingüística. Llame al [phone].    We comply with applicable federal civil rights laws and [place] laws. We do not discriminate on the basis of race, color, national origin, age, disability, sex, sexual orientation, or gender identity.            Thank you!     Thank you for choosing [facility]  for your care. Our goal is always to provide you with excellent care. Hearing back from our patients is one way we can continue to improve our services. Please take a few minutes to complete the written survey that you may receive in the mail after your visit with us. Thank you!             Your Updated Medication List - Protect others around you: Learn how to safely use, store and throw away your medicines at www.disposemymeds.org.          This list is accurate as of 3/7/18  5:05 PM.  Always use your most recent med list.                   Brand Name Dispense Instructions for use Diagnosis    gabapentin 300 MG capsule    NEURONTIN    60 capsule    Take 2 capsules (600 mg) by mouth 3 times daily    Neuropathy       HYDROcodone-acetaminophen 5-325 MG per tablet    NORCO    30 tablet    1 tab bid prn pain    Pain in finger of both hands       sertraline 50 MG tablet    ZOLOFT    30 tablet    Take 1  tablet (50 mg) by mouth daily    Adjustment disorder with depressed mood       * SUMAtriptan 50 MG tablet    IMITREX    18 tablet    Take 1 tablet (50 mg) by mouth at onset of headache May repeat in 1 hour if needed    Migraine without status migrainosus, not intractable, unspecified migraine type       * SUMAtriptan 100 MG tablet    IMITREX    12 tablet    Take 1 tablet (100 mg) by mouth at onset of headache for migraine May repeat in 2 hours. Max 2 tablets/24 hours.    Migraine without status migrainosus, not intractable, unspecified migraine type       * Notice:  This list has 2 medication(s) that are the same as other medications prescribed for you. Read the directions carefully, and ask your doctor or other care provider to review them with you.

## 2018-03-07 NOTE — NURSING NOTE
"Initial /78 (BP Location: Left arm, Patient Position: Chair, Cuff Size: Adult Regular)  Pulse 78  Temp 98.3  F (36.8  C) (Tympanic)  Ht 5' 6.75\" (1.695 m)  Wt 153 lb (69.4 kg)  SpO2 99%  Breastfeeding? No  BMI 24.14 kg/m2 Estimated body mass index is 24.14 kg/(m^2) as calculated from the following:    Height as of this encounter: 5' 6.75\" (1.695 m).    Weight as of this encounter: 153 lb (69.4 kg). .    Barbara Fair CMA ([facility])  "

## 2018-03-08 LAB — TSH SERPL DL<=0.005 MIU/L-ACNC: 3.11 MU/L (ref 0.4–4)

## 2018-03-12 DIAGNOSIS — G62.9 NEUROPATHY: ICD-10-CM

## 2018-03-13 RX ORDER — GABAPENTIN 300 MG/1
CAPSULE ORAL
Qty: 60 CAPSULE | Refills: 0 | Status: SHIPPED | OUTPATIENT
Start: 2018-03-13 | End: 2018-03-24

## 2018-03-13 NOTE — TELEPHONE ENCOUNTER
gabapentin (NEURONTIN) 300 MG capsule     Last Written Prescription Date:  1/29/18  Last Fill Quantity: 60,   # refills: 3  Last Office Visit: 3/7/18  Future Office visit:       Routing refill request to provider for review/approval because:  Drug not on the [facility], [facility] or [facility] refill protocol or controlled substance

## 2018-03-24 DIAGNOSIS — G62.9 NEUROPATHY: ICD-10-CM

## 2018-03-26 RX ORDER — GABAPENTIN 300 MG/1
CAPSULE ORAL
Qty: 540 CAPSULE | Refills: 5 | Status: SHIPPED | OUTPATIENT
Start: 2018-03-26 | End: 2018-06-20

## 2018-03-26 NOTE — TELEPHONE ENCOUNTER
gabapentin (NEURONTIN) 300 MG capsule      Last Written Prescription Date:  3/13/18  Last Fill Quantity: 60,   # refills: 0  Last Office Visit: 3/7/18  Future Office visit:       Routing refill request to provider for review/approval because:  Drug not on the [facility], [facility] or [facility] refill protocol or controlled substance

## 2018-04-05 DIAGNOSIS — M79.645 PAIN IN FINGER OF BOTH HANDS: ICD-10-CM

## 2018-04-05 DIAGNOSIS — M79.644 PAIN IN FINGER OF BOTH HANDS: ICD-10-CM

## 2018-04-05 RX ORDER — HYDROCODONE BITARTRATE AND ACETAMINOPHEN 5; 325 MG/1; MG/1
TABLET ORAL
Qty: 30 TABLET | Refills: 0 | Status: SHIPPED | OUTPATIENT
Start: 2018-04-05 | End: 2018-05-03

## 2018-04-05 NOTE — TELEPHONE ENCOUNTER
Please let her know I will refill one time, and these need to last until she sees the Neurologist.     Thanks.

## 2018-04-05 NOTE — TELEPHONE ENCOUNTER
Requested Prescriptions   Pending Prescriptions Disp Refills     HYDROcodone-acetaminophen (NORCO) 5-325 MG per tablet 30 tablet      Si tab bid prn pain    There is no refill protocol information for this order        Last Written Prescription Date:  3/7/18  Last Fill Quantity: 30,  # refills: 0   Last office visit: 3/7/2018 with prescribing provider:  waqas Hoskins   Future Office Visit:      Janine [place] Sec

## 2018-04-12 PROBLEM — F43.21 ADJUSTMENT DISORDER WITH DEPRESSED MOOD: Status: ACTIVE | Noted: 2018-04-12

## 2018-05-03 ENCOUNTER — OFFICE VISIT (OUTPATIENT)
Dept: FAMILY MEDICINE | Facility: CLINIC | Age: 43
End: 2018-05-03
Payer: COMMERCIAL

## 2018-05-03 VITALS
OXYGEN SATURATION: 98 % | TEMPERATURE: 98.7 F | BODY MASS INDEX: 23.86 KG/M2 | DIASTOLIC BLOOD PRESSURE: 82 MMHG | WEIGHT: 151.2 LBS | RESPIRATION RATE: 18 BRPM | SYSTOLIC BLOOD PRESSURE: 136 MMHG | HEART RATE: 100 BPM

## 2018-05-03 DIAGNOSIS — M79.644 PAIN IN FINGER OF BOTH HANDS: ICD-10-CM

## 2018-05-03 DIAGNOSIS — M79.645 PAIN IN FINGER OF BOTH HANDS: ICD-10-CM

## 2018-05-03 PROCEDURE — 99213 OFFICE O/P EST LOW 20 MIN: CPT | Performed by: FAMILY MEDICINE

## 2018-05-03 RX ORDER — HYDROCODONE BITARTRATE AND ACETAMINOPHEN 5; 325 MG/1; MG/1
TABLET ORAL
Qty: 30 TABLET | Refills: 0 | Status: SHIPPED | OUTPATIENT
Start: 2018-05-03 | End: 2018-06-04

## 2018-05-03 ASSESSMENT — PAIN SCALES - GENERAL: PAINLEVEL: EXTREME PAIN (8)

## 2018-05-03 NOTE — PROGRESS NOTES
SUBJECTIVE:   Ariadne Lowry is a 43 year old female who presents to clinic today for the following health issues:      Depression Followup    Status since last visit: Improved     S: Ariadne Lowry is a 43 year old female with bilateral hand and wrist and lower arm pain.  See my previous note.      MRI does not suggest that this is neck in origin.     Has strong family hx on both sides of carpal tunnel issues    EMG negative, but EMG negative carpal tunnel occurs in about 10-15 percent of cases.     She went to [facility], then focused on her neck ,gave her a bunch of lyrica, then tizanidine, then topamax.  None of that is working     norco helps.  1-2/day really helps.  But that's not a great long term plan for carpal tunnel or unexplained hand pain.     Problem list, med list, additional histories reviewed and updated, as indicated.      O:/82  Pulse 100  Temp 98.7  F (37.1  C)  Resp 18  Wt 151 lb 3.2 oz (68.6 kg)  SpO2 98%  Breastfeeding? No  BMI 23.86 kg/m2  GEN: Alert and oriented, in no acute distress  Gets frustrated talking about her hand pain    A: hand pain, bilateral      Possible EMG negative carpal tunnel    P: I want our neurologist to weigh in on EMG negative carpal tunnel as possible explanation for her sx.     I do not think her MRI suggests a bilateral cause of hand/wrist pain with neck as origin.    I will give her 30 more norco.  I will sent a note to neuro.

## 2018-05-03 NOTE — MR AVS SNAPSHOT
"              After Visit Summary   5/3/2018    Ariadne Lowry    MRN: [id number]           Patient Information     Date Of Birth          1975        Visit Information        Provider Department      5/3/2018 3:40 PM Young Hoskins MD [facility]        Today's Diagnoses     Pain in finger of both hands           Follow-ups after your visit        Your next 10 appointments already scheduled     May 22, 2018  2:15 PM CDT   New Visit with Freida Lyons MD   [facility]    [address] [id number] [phone]              Who to contact     If you have questions or need follow up information about today's clinic visit or your schedule please contact [facility] directly at [phone].  Normal or non-critical lab and imaging results will be communicated to you by MyChart, letter or phone within 4 business days after the clinic has received the results. If you do not hear from us within 7 days, please contact the clinic through MyChart or phone. If you have a critical or abnormal lab result, we will notify you by phone as soon as possible.  Submit refill requests through Res[employer] Vibes or call your pharmacy and they will forward the refill request to us. Please allow 3 business days for your refill to be completed.          Additional Information About Your Visit        MyChart Information     Res[employer] V[employer]es lets you send messages to your doctor, view your test results, renew your prescriptions, schedule appointments and more. To sign up, go to [email or web address] . Click on \"Log in\" on the left side of the screen, which will take you to the Welcome page. Then click on \"Sign up Now\" on the right side of the page.     You will be asked to enter the access code listed below, as well as some personal information. Please follow the directions to create your username and password.     Your access code is: [email or web address]  Expires: " 2018  4:30 PM     Your access code will  in 90 days. If you need help or a new code, please call your [facility] clinic or [phone].        Care EveryWhere ID     This is your Care EveryWhere ID. This could be used by other organizations to access your [facility] medical records  [id number]        Your Vitals Were     Pulse Temperature Respirations Pulse Oximetry Breastfeeding? BMI (Body Mass Index)    100 98.7  F (37.1  C) 18 98% No 23.86 kg/m2       Blood Pressure from Last 3 Encounters:   18 136/82   18 128/78   18 134/76    Weight from Last 3 Encounters:   18 151 lb 3.2 oz (68.6 kg)   18 153 lb (69.4 kg)   18 157 lb (71.2 kg)              Today, you had the following     No orders found for display         Where to get your medicines      Some of these will need a paper prescription and others can be bought over the counter.  Ask your nurse if you have questions.     Bring a paper prescription for each of these medications     HYDROcodone-acetaminophen 5-325 MG per tablet         Information about OPIOIDS     PRESCRIPTION OPIOIDS: WHAT YOU NEED TO KNOW   You have a prescription for an opioid (narcotic) pain medicine. Opioids can cause addiction. If you have a history of chemical dependency of any type, you are at a higher risk of becoming addicted to opioids. Only take this medicine after all other options have been tried. Take it for as short a time and as few doses as possible.     Do not:    Drive. If you drive while taking these medicines, you could be arrested for driving under the influence (DUI).    Operate heavy machinery    Do any other dangerous activities while taking these medicines.     Drink any alcohol while taking these medicines.      Take with any other medicines that contain acetaminophen. Read all labels carefully. Look for the word  acetaminophen  or  Tylenol.  Ask your pharmacist if you have questions or are unsure.    Store your pills in a  secure place, locked if possible. We will not replace any lost or stolen medicine. If you don t finish your medicine, please throw away (dispose) as directed by your pharmacist. The [place] [employer]ution [employer] has more information about safe disposal: https://www.pca.[facility].[place].us/living-green/managing-unwanted-medications    All opioids tend to cause constipation. Drink plenty of water and eat foods that have a lot of fiber, such as fruits, vegetables, prune juice, apple juice and high-fiber cereal. Take a laxative (Miralax, milk of magnesia, Colace, Senna) if you don t move your bowels at least every other day.          Primary Care Provider Office Phone # Fax #    Young Hoskins [phone] [address] [place] [phone]        Equal Access to Services     THANG SIMON : Hadii victorina zuleta hadasho Sodesire, waaxda luqadaha, qaybta kaalmada adedanyelle, karrie aragon . So [facility] [phone].    ATENCIÓN: Si habla español, tiene a mckenzie disposición servicios gratuitos de asistencia lingüística. Rito al [phone].    We comply with applicable federal civil rights laws and [place] laws. We do not discriminate on the basis of race, color, national origin, age, disability, sex, sexual orientation, or gender identity.            Thank you!     Thank you for choosing [facility]  for your care. Our goal is always to provide you with excellent care. Hearing back from our patients is one way we can continue to improve our services. Please take a few minutes to complete the written survey that you may receive in the mail after your visit with us. Thank you!             Your Updated Medication List - Protect others around you: Learn how to safely use, store and throw away your medicines at www.disposemymeds.org.          This list is accurate as of 5/3/18  4:30 PM.  Always use your most recent med list.                   Brand Name Dispense  Instructions for use Diagnosis    gabapentin 300 MG capsule    NEURONTIN    540 capsule    TAKE 2 CAPSULES(600 MG) BY MOUTH THREE TIMES DAILY    Neuropathy       HYDROcodone-acetaminophen 5-325 MG per tablet    NORCO    30 tablet    1 tab bid prn pain    Pain in finger of both hands       sertraline 50 MG tablet    ZOLOFT    90 tablet    Take 1 tablet (50 mg) by mouth daily    Adjustment disorder with depressed mood       SUMAtriptan 100 MG tablet    IMITREX    12 tablet    Take 1 tablet (100 mg) by mouth at onset of headache for migraine May repeat in 2 hours. Max 2 tablets/24 hours.    Migraine without status migrainosus, not intractable, unspecified migraine type

## 2018-05-03 NOTE — NURSING NOTE
"Chief Complaint   Patient presents with     Depression     Pain       Initial /82  Pulse 100  Temp 98.7  F (37.1  C)  Resp 18  Wt 151 lb 3.2 oz (68.6 kg)  SpO2 98%  Breastfeeding? No  BMI 23.86 kg/m2 Estimated body mass index is 23.86 kg/(m^2) as calculated from the following:    Height as of 3/7/18: 5' 6.75\" (1.695 m).    Weight as of this encounter: 151 lb 3.2 oz (68.6 kg).      Health Maintenance that is potentially due pending provider review:  Pap Smear    Pt declines to have pap at this time.      "

## 2018-05-22 ENCOUNTER — OFFICE VISIT (OUTPATIENT)
Dept: NEUROLOGY | Facility: CLINIC | Age: 43
End: 2018-05-22
Payer: COMMERCIAL

## 2018-05-22 VITALS
HEART RATE: 81 BPM | DIASTOLIC BLOOD PRESSURE: 77 MMHG | SYSTOLIC BLOOD PRESSURE: 119 MMHG | BODY MASS INDEX: 24.36 KG/M2 | RESPIRATION RATE: 12 BRPM | WEIGHT: 154.4 LBS | TEMPERATURE: 97.8 F

## 2018-05-22 DIAGNOSIS — M79.641 PAIN IN BOTH HANDS: Primary | ICD-10-CM

## 2018-05-22 DIAGNOSIS — R20.2 PARESTHESIAS: ICD-10-CM

## 2018-05-22 DIAGNOSIS — M79.642 PAIN IN BOTH HANDS: Primary | ICD-10-CM

## 2018-05-22 DIAGNOSIS — M50.30 DDD (DEGENERATIVE DISC DISEASE), CERVICAL: ICD-10-CM

## 2018-05-22 PROCEDURE — 99204 OFFICE O/P NEW MOD 45 MIN: CPT | Performed by: PSYCHIATRY & NEUROLOGY

## 2018-05-22 RX ORDER — TOPIRAMATE 25 MG/1
TABLET, FILM COATED ORAL
Qty: 120 TABLET | Refills: 3 | Status: SHIPPED | OUTPATIENT
Start: 2018-05-22 | End: 2018-07-02

## 2018-05-22 ASSESSMENT — PAIN SCALES - GENERAL: PAINLEVEL: MODERATE PAIN (4)

## 2018-05-22 NOTE — NURSING NOTE
LORI faxed to [facility].  [phone].  Transmission confirmed via Right Fax.  06/04/18  EMG results received from [facility].

## 2018-05-22 NOTE — PROGRESS NOTES
"INITIAL NEUROLOGY CONSULTATION    DATE OF VISIT: 5/22/2018  MRN: [id number]  PATIENT NAME: Ariadne Lowry  YOB: 1975    REFERRING PROVIDER: No ref. provider found    Chief Complaint   Patient presents with     Pain     BUE.  New patient.  Referral by Young Hoskins MD.       SUBJECTIVE:                                                      HPI:   Ariadne Lowry is a 43 year old female whom I was asked by Dr. Hoskins to see in consultation for pain her her fingers. Per chart review, she was seen at [facility] in 10.2016 for bilateral wrist and hand pain. She had been treated with night splints, and physical therapy with little pain improvement. Hands were tender, but strength and sensation were intact on Dr. Mcintosh's exam. She did some injections and recommended hydrocodone, with plans to do surgery if symptoms persisted. There was some minimal reported symptoms improvement. She was then referred for electromyogram, which I read was normal though I cannot find the report. She does have some degenerative changes in the cervical spine per MRI report but it is not clear if radiculopathy was reflected in the electromyogram results. The patient did see Dr. Hopson at some point and had also been started on gabapentin for pain, according to a primary clinic note by Emily Campoverde in January 2018. Repeat cervical MRI showed relatively mild degenerative changes, with some C5-C6 disk bulging. She was referred on to Dr. Gupta at [facility], who reviewed her case and denoted symptoms consistent with fibromyalgia. She subsequently asked for referral to Pain clinic, but instead was referred here.     The patient tells me that her symptoms started about 2 years ago. She started to have wrist pain that radiated up and down the back of the hand and up the forearm. She says the pain was shooting and excruciating and she was unable to hold a cup at times. No weakness, just pain that limits her activity. If she is very active one day, she \"will " "pay\" for days afterward. No similar symptoms elsewhere. The patient confirms she was told that the electromyogram was completely normal. She says that she was also told that her neck problems were \"non-surgical.\" She was subsequently started on gabapentin, with a slow increase in the dose. She is not sure if the injections happened because she says she was on more hydrocodone then and is not sure what was effective.     She says she did see a DrMariaelena Friday at [facility] recently, who apparently took her off of the gabapentin and she was going to try Lyrica but then this was too expensive. They tried something else: She thinks maybe Topamax. She tried a low dose for 1.5 weeks and was in such excruciating pain, had to switch back to the gabapentin (now on 600mg TID). This does help. She has trouble with sexual side effects with the gabapentin, however. She says this many times throughout the visit. She continues to take hydrocodone as needed. She is very concerned about making sure she can get this refilled and mentions that her primary care provider is going to be out on [facility] and doesn't know what to do. She denies side effects from the Topamax.     She has not had any color changes or temperature changes or swelling. She says no similar pain elsewhere.     She has tried chiropractor treatments. No history of kidney disease. She has had tubal ligation.     She mentions that multiple family members have had problems with CTS.       Past Medical History:   Diagnosis Date     Carpal tunnel syndrome     [facility].  Bilateral carpal tunnel injections on 10/3/2016     No past surgical history on file.      Current Outpatient Prescriptions on File Prior to Visit:  gabapentin (NEURONTIN) 300 MG capsule TAKE 2 CAPSULES(600 MG) BY MOUTH THREE TIMES DAILY   HYDROcodone-acetaminophen (NORCO) 5-325 MG per tablet 1 tab bid prn pain   sertraline (ZOLOFT) 50 MG tablet Take 1 tablet (50 mg) by mouth daily   SUMAtriptan (IMITREX) " 100 MG tablet Take 1 tablet (100 mg) by mouth at onset of headache for migraine May repeat in 2 hours. Max 2 tablets/24 hours.     No current facility-administered medications on file prior to visit.   No Known Allergies     Problem (# of Occurrences) Relation (Name,Age of Onset)    Alcoholism (1) Paternal Grandfather    Depression (1) Daughter    HEART DISEASE (2) Father, Maternal Grandfather    Neurologic Disorder (1) Father    Psychotic Disorder (1) Daughter    Seasonal/Environmental Allergies (1) Mother    Thyroid Disease (1) Mother        Social History   Substance Use Topics     Smoking status: Former Smoker     Quit date: 11/9/2004     Smokeless tobacco: Never Used     Alcohol use No       REVIEW OF SYSTEMS:                                                      10-point review of systems is negative except as mentioned above in HPI.     EXAM:                                                      Physical Exam:   Vitals: /77 (BP Location: Right arm, Patient Position: Sitting, Cuff Size: Adult Regular)  Pulse 81  Temp 97.8  F (36.6  C) (Oral)  Resp 12  Wt 70 kg (154 lb 6.4 oz)  LMP 05/15/2018 (Approximate)  Breastfeeding? No  BMI 24.36 kg/m2  BMI= Body mass index is 24.36 kg/(m^2).  GENERAL: NAD.   Neurologic:  MENTAL STATUS: Alert, attentive. Speech is fluent, pressured at times. Normal comprehension.  Normal attention and concentration. Adequate fund of knowledge.   CRANIAL NERVES: Visual fields intact to confrontation. Pupils equally, round and reactive to light. Facial sensation and movement normal. EOM full. Hearing intact to conversation. Trapezius strength intact. Palate moves symmetrically. Tongue midline.  MOTOR: 5/5 in proximal and distal muscle groups of upper and lower extremities. Tone and bulk normal.   DTRs: Intact and symmetric.  Ankle jerks present. Babinski down-going bilaterally.   SENSATION: Normal light touch and pinprick. Intact proprioception. Vibration: Normal at both ankles.    COORDINATION: Normal finger nose finger. Finger tapping normal. Knee heel shin normal.  STATION AND GAIT: Romberg negative. Tandem normal.  CV: RRR.  S1, S2.   NECK: No bruits.    ASSESSMENT and PLAN:                                                      Assessment and Plan:     ICD-10-CM    1. Pain in both hands M79.641 ORTHO  REFERRAL    M79.642 topiramate (TOPAMAX) 25 MG tablet   2. Paresthesias R20.2 ORTHO  REFERRAL     topiramate (TOPAMAX) 25 MG tablet   3. DDD (degenerative disc disease), cervical M50.30         Ms. Lowry is a pleasant 44 yo woman with 2-year history of bilateral wrist, hand and forearm pain. Her symptoms certainly sound consistent with CTS, however it would be unusual to find nothing on NCV if median nerve compression was causing this degree of pain. She does not have any weakness on exam. I do not have the prior nerve conduction studies to review, so I would like to repeat the testing. I explained to the patient that various types of neuropathy can have negative findings at first and then later become positive electrodiagnostically. That could be the case here.     In the meantime, the gabapentin is working, so I suggested she continue until we can find something else that works. I think that the Topamax recommended by the other neurologist was a reasonable choice, and she denies side effects, so I would add a titrating dose of this. If effective, she can then taper off of the gabapentin.     If the electrodiagnostic testing is again negative, I recommend she seek further symptomatic management in Pain Clinic. The patient understands and agrees with the plan.     One additional note: Patient had pressured speech and appeared quite anxious at times in clinic today. I am not sure if this is due to frustration about ongoing pain, or if there is some underlying anxiety feeding her pain symptoms.    Patient Instructions:  Nerve conduction studies/EMG. We will try to get your  previous study results as well, so the two can be compared.  Restart the Topamax: 25mg at bedtime and increase weekly by 25mg up to 50mg twice daily.   *Information provided.   Continue the current dose of gabapentin for now: 600mg 3 times daily. We will consider tapering off of the medication, once you reach a full maintenance dose of Topamax.  If the nerve conduction studies are again normal, I will refer you on to Pain Clinic.     Total Time: 45 minutes were spent with the patient. More than 50% of the time spent on counseling (as described above in Assessment and Plan) /coordinating the care.    Freida Lyons MD  Neurology    CC: Young Hoskins MD

## 2018-05-22 NOTE — MR AVS SNAPSHOT
After Visit Summary   5/22/2018    Ariadne Lowry    MRN: [id number]           Patient Information     Date Of Birth          1975        Visit Information        Provider Department      5/22/2018 2:15 PM Freida Lyons MD [facility]        Today's Diagnoses     Pain in both hands    -  1    Paresthesias          Care Instructions    Plan:    Nerve conduction studies/EMG. We will try to get your previous study results as well, so the two can be compared.  Restart the Topamax: 25mg at bedtime and increase weekly by 25mg up to 50mg twice daily.   *Information provided.   Continue the current dose of gabapentin for now: 600mg 3 times daily. We will consider tapering off of the medication, once you reach a full maintenance dose of Topamax.  If the nerve conduction studies are again normal, I will refer you on to Pain Clinic.             Follow-ups after your visit        Additional Services     ORTHO  REFERRAL       [facility] is referring you to the Orthopedic  Services at [facility].       The  Representative will assist you in the coordination of your Orthopedic and Musculoskeletal Care as prescribed by your physician.    The  Representative will call you within 1 business day to help schedule your appointment, or you may contact the  Representative at:    All areas ~ [phone]     Type of Referral : Non Surgical - Shooting pain from wrists. Previous reportedly normal UE nerve conduction studies/EMG ([facility])      Timeframe requested: Routine    Coverage of these services is subject to the terms and limitations of your health insurance plan.  Please call member services at your health plan with any benefit or coverage questions.      If X-rays, CT or MRI's have been performed, please contact the facility where they were done to arrange for , prior to your scheduled appointment.  Please  "bring this referral request to your appointment and present it to your specialist.                  Who to contact     If you have questions or need follow up information about today's clinic visit or your schedule please contact [facility] directly at [phone].  Normal or non-critical lab and imaging results will be communicated to you by MyChart, letter or phone within 4 business days after the clinic has received the results. If you do not hear from us within 7 days, please contact the clinic through Eloxxhart or phone. If you have a critical or abnormal lab result, we will notify you by phone as soon as possible.  Submit refill requests through [employer]se or call your pharmacy and they will forward the refill request to us. Please allow 3 business days for your refill to be completed.          Additional Information About Your Visit        Eloxx[employer] Level Enterprieses Information     Whelse lets you send messages to your doctor, view your test results, renew your prescriptions, schedule appointments and more. To sign up, go to [email or web address] . Click on \"Log in\" on the left side of the screen, which will take you to the Welcome page. Then click on \"Sign up Now\" on the right side of the page.     You will be asked to enter the access code listed below, as well as some personal information. Please follow the directions to create your username and password.     Your access code is: [email or web address]  Expires: 2018  4:30 PM     Your access code will  in 90 days. If you need help or a new code, please call your [facility] or [phone].        Care EveryWhere ID     This is your Care EveryWhere ID. This could be used by other organizations to access your [facility] medical records  [id number]        Your Vitals Were     Pulse Temperature Respirations Last Period Breastfeeding? BMI (Body Mass Index)    81 97.8  F (36.6  C) (Oral) 12 05/15/2018 (Approximate) No 24.36 kg/m2       Blood Pressure from Last 3 " Encounters:   05/22/18 119/77   05/03/18 136/82   03/07/18 128/78    Weight from Last 3 Encounters:   05/22/18 70 kg (154 lb 6.4 oz)   05/03/18 68.6 kg (151 lb 3.2 oz)   03/07/18 69.4 kg (153 lb)              We Performed the Following     ORTHO  REFERRAL          Today's Medication Changes          These changes are accurate as of 5/22/18  3:00 PM.  If you have any questions, ask your nurse or doctor.               Start taking these medicines.        Dose/Directions    topiramate 25 MG tablet   Commonly known as:  TOPAMAX   Used for:  Pain in both hands, Paresthesias   Started by:  Freida Lyons MD        Take 1 tablet (25 mg) at bedtime for 1 week, then 1 tablet twice daily for 1 week, then 1 tablet in AM and 2 in PM for 1 week, then 2 tablets twice daily.   Quantity:  120 tablet   Refills:  3            Where to get your medicines      Some of these will need a paper prescription and others can be bought over the counter.  Ask your nurse if you have questions.     Bring a paper prescription for each of these medications     topiramate 25 MG tablet                Primary Care Provider Office Phone # Fax #    Young Hoskins [phone] [address] [place] [phone]        Equal Access to Services     THANG SIMON AH: Hadii aad ku hadasho Soomaali, waaxda luqadaha, qaybta kaalmada adeegyada, karrie puckett. So [facility] [phone].    ATENCIÓN: Si habla español, tiene a mckenzie disposición servicios gratuitos de asistencia lingüística. Llame al [phone].    We comply with applicable federal civil rights laws and [place] laws. We do not discriminate on the basis of race, color, national origin, age, disability, sex, sexual orientation, or gender identity.            Thank you!     Thank you for choosing [facility]  for your care. Our goal is always to provide you with excellent care. Hearing back from our patients is one way we  can continue to improve our services. Please take a few minutes to complete the written survey that you may receive in the mail after your visit with us. Thank you!             Your Updated Medication List - Protect others around you: Learn how to safely use, store and throw away your medicines at www.disposemymeds.org.          This list is accurate as of 5/22/18  3:00 PM.  Always use your most recent med list.                   Brand Name Dispense Instructions for use Diagnosis    gabapentin 300 MG capsule    NEURONTIN    540 capsule    TAKE 2 CAPSULES(600 MG) BY MOUTH THREE TIMES DAILY    Neuropathy       HYDROcodone-acetaminophen 5-325 MG per tablet    NORCO    30 tablet    1 tab bid prn pain    Pain in finger of both hands       sertraline 50 MG tablet    ZOLOFT    90 tablet    Take 1 tablet (50 mg) by mouth daily    Adjustment disorder with depressed mood       SUMAtriptan 100 MG tablet    IMITREX    12 tablet    Take 1 tablet (100 mg) by mouth at onset of headache for migraine May repeat in 2 hours. Max 2 tablets/24 hours.    Migraine without status migrainosus, not intractable, unspecified migraine type       topiramate 25 MG tablet    TOPAMAX    120 tablet    Take 1 tablet (25 mg) at bedtime for 1 week, then 1 tablet twice daily for 1 week, then 1 tablet in AM and 2 in PM for 1 week, then 2 tablets twice daily.    Pain in both hands, Paresthesias

## 2018-05-22 NOTE — NURSING NOTE
Patient prefers to be contacted: letter  Okay to leave detailed message on voicemail: n/a    Diya DEJESUS-LILLIE

## 2018-05-22 NOTE — LETTER
5/22/2018         RE: Ariadne Lowry  [address] [place] [id number]        Dear Colleague,    Thank you for referring your patient, Ariadne Lowry, to the [facility]. Please see a copy of my visit note below.    INITIAL NEUROLOGY CONSULTATION    DATE OF VISIT: 5/22/2018  MRN: [id number]  PATIENT NAME: Ariadne Lowry  YOB: 1975    REFERRING PROVIDER: No ref. provider found    Chief Complaint   Patient presents with     Pain     BUE.  New patient.  Referral by Young Hoskins MD.       SUBJECTIVE:                                                      HPI:   Ariadne Lowry is a 43 year old female whom I was asked by Dr. Hoskins to see in consultation for pain her her fingers. Per chart review, she was seen at [facility] in 10.2016 for bilateral wrist and hand pain. She had been treated with night splints, and physical therapy with little pain improvement. Hands were tender, but strength and sensation were intact on Dr. Mcintosh's exam. She did some injections and recommended hydrocodone, with plans to do surgery if symptoms persisted. There was some minimal reported symptoms improvement. She was then referred for electromyogram, which I read was normal though I cannot find the report. She does have some degenerative changes in the cervical spine per MRI report but it is not clear if radiculopathy was reflected in the electromyogram results. The patient did see Dr. Hopson at some point and had also been started on gabapentin for pain, according to a primary clinic note by Emily Campoverde in January 2018. Repeat cervical MRI showed relatively mild degenerative changes, with some C5-C6 disk bulging. She was referred on to Dr. Gupta at [facility], who reviewed her case and denoted symptoms consistent with fibromyalgia. She subsequently asked for referral to Pain clinic, but instead was referred here.     The patient tells me that her symptoms started about 2 years ago. She started to have wrist pain that radiated up  "and down the back of the hand and up the forearm. She says the pain was shooting and excruciating and she was unable to hold a cup at times. No weakness, just pain that limits her activity. If she is very active one day, she \"will pay\" for days afterward. No similar symptoms elsewhere. The patient confirms she was told that the electromyogram was completely normal. She says that she was also told that her neck problems were \"non-surgical.\" She was subsequently started on gabapentin, with a slow increase in the dose. She is not sure if the injections happened because she says she was on more hydrocodone then and is not sure what was effective.     She says she did see a DrMariaelena Friday at [facility] recently, who apparently took her off of the gabapentin and she was going to try Lyrica but then this was too expensive. They tried something else: She thinks maybe Topamax. She tried a low dose for 1.5 weeks and was in such excruciating pain, had to switch back to the gabapentin (now on 600mg TID). This does help. She has trouble with sexual side effects with the gabapentin, however. She says this many times throughout the visit. She continues to take hydrocodone as needed. She is very concerned about making sure she can get this refilled and mentions that her primary care provider is going to be out on [facility] and doesn't know what to do. She denies side effects from the Topamax.     She has not had any color changes or temperature changes or swelling. She says no similar pain elsewhere.     She has tried chiropractor treatments. No history of kidney disease. She has had tubal ligation.     She mentions that multiple family members have had problems with CTS.       Past Medical History:   Diagnosis Date     Carpal tunnel syndrome     [facility].  Bilateral carpal tunnel injections on 10/3/2016     No past surgical history on file.      Current Outpatient Prescriptions on File Prior to Visit:  gabapentin (NEURONTIN) 300 " MG capsule TAKE 2 CAPSULES(600 MG) BY MOUTH THREE TIMES DAILY   HYDROcodone-acetaminophen (NORCO) 5-325 MG per tablet 1 tab bid prn pain   sertraline (ZOLOFT) 50 MG tablet Take 1 tablet (50 mg) by mouth daily   SUMAtriptan (IMITREX) 100 MG tablet Take 1 tablet (100 mg) by mouth at onset of headache for migraine May repeat in 2 hours. Max 2 tablets/24 hours.     No current facility-administered medications on file prior to visit.   No Known Allergies     Problem (# of Occurrences) Relation (Name,Age of Onset)    Alcoholism (1) Paternal Grandfather    Depression (1) Daughter    HEART DISEASE (2) Father, Maternal Grandfather    Neurologic Disorder (1) Father    Psychotic Disorder (1) Daughter    Seasonal/Environmental Allergies (1) Mother    Thyroid Disease (1) Mother        Social History   Substance Use Topics     Smoking status: Former Smoker     Quit date: 11/9/2004     Smokeless tobacco: Never Used     Alcohol use No       REVIEW OF SYSTEMS:                                                      10-point review of systems is negative except as mentioned above in HPI.     EXAM:                                                      Physical Exam:   Vitals: /77 (BP Location: Right arm, Patient Position: Sitting, Cuff Size: Adult Regular)  Pulse 81  Temp 97.8  F (36.6  C) (Oral)  Resp 12  Wt 70 kg (154 lb 6.4 oz)  LMP 05/15/2018 (Approximate)  Breastfeeding? No  BMI 24.36 kg/m2  BMI= Body mass index is 24.36 kg/(m^2).  GENERAL: NAD.   Neurologic:  MENTAL STATUS: Alert, attentive. Speech is fluent, pressured at times. Normal comprehension.  Normal attention and concentration. Adequate fund of knowledge.   CRANIAL NERVES: Visual fields intact to confrontation. Pupils equally, round and reactive to light. Facial sensation and movement normal. EOM full. Hearing intact to conversation. Trapezius strength intact. Palate moves symmetrically. Tongue midline.  MOTOR: 5/5 in proximal and distal muscle groups of upper  and lower extremities. Tone and bulk normal.   DTRs: Intact and symmetric.  Ankle jerks present. Babinski down-going bilaterally.   SENSATION: Normal light touch and pinprick. Intact proprioception. Vibration: Normal at both ankles.   COORDINATION: Normal finger nose finger. Finger tapping normal. Knee heel shin normal.  STATION AND GAIT: Romberg negative. Tandem normal.  CV: RRR.  S1, S2.   NECK: No bruits.    ASSESSMENT and PLAN:                                                      Assessment and Plan:     ICD-10-CM    1. Pain in both hands M79.641 ORTHO  REFERRAL    M79.642 topiramate (TOPAMAX) 25 MG tablet   2. Paresthesias R20.2 ORTHO  REFERRAL     topiramate (TOPAMAX) 25 MG tablet   3. DDD (degenerative disc disease), cervical M50.30         Ms. Lowry is a pleasant 42 yo woman with 2-year history of bilateral wrist, hand and forearm pain. Her symptoms certainly sound consistent with CTS, however it would be unusual to find nothing on NCV if median nerve compression was causing this degree of pain. She does not have any weakness on exam. I do not have the prior nerve conduction studies to review, so I would like to repeat the testing. I explained to the patient that various types of neuropathy can have negative findings at first and then later become positive electrodiagnostically. That could be the case here.     In the meantime, the gabapentin is working, so I suggested she continue until we can find something else that works. I think that the Topamax recommended by the other neurologist was a reasonable choice, and she denies side effects, so I would add a titrating dose of this. If effective, she can then taper off of the gabapentin.     If the electrodiagnostic testing is again negative, I recommend she seek further symptomatic management in Pain Clinic. The patient understands and agrees with the plan.     One additional note: Patient had pressured speech and appeared quite anxious at  times in clinic today. I am not sure if this is due to frustration about ongoing pain, or if there is some underlying anxiety feeding her pain symptoms.    Patient Instructions:  Nerve conduction studies/EMG. We will try to get your previous study results as well, so the two can be compared.  Restart the Topamax: 25mg at bedtime and increase weekly by 25mg up to 50mg twice daily.   *Information provided.   Continue the current dose of gabapentin for now: 600mg 3 times daily. We will consider tapering off of the medication, once you reach a full maintenance dose of Topamax.  If the nerve conduction studies are again normal, I will refer you on to Pain Clinic.     Total Time: 45 minutes were spent with the patient. More than 50% of the time spent on counseling (as described above in Assessment and Plan) /coordinating the care.    Freida Lyons MD  Neurology    CC: Young Hoskins MD    Again, thank you for allowing me to participate in the care of your patient.        Sincerely,        Freida Lyons MD

## 2018-05-22 NOTE — PATIENT INSTRUCTIONS
Plan:    Nerve conduction studies/EMG. We will try to get your previous study results as well, so the two can be compared.  Restart the Topamax: 25mg at bedtime and increase weekly by 25mg up to 50mg twice daily.   *Information provided.   Continue the current dose of gabapentin for now: 600mg 3 times daily. We will consider tapering off of the medication, once you reach a full maintenance dose of Topamax.  If the nerve conduction studies are again normal, I will refer you on to Pain Clinic.

## 2018-05-31 ENCOUNTER — TELEPHONE (OUTPATIENT)
Dept: FAMILY MEDICINE | Facility: CLINIC | Age: 43
End: 2018-05-31

## 2018-05-31 ENCOUNTER — OFFICE VISIT (OUTPATIENT)
Dept: URGENT CARE | Facility: URGENT CARE | Age: 43
End: 2018-05-31
Payer: COMMERCIAL

## 2018-05-31 VITALS
DIASTOLIC BLOOD PRESSURE: 72 MMHG | RESPIRATION RATE: 14 BRPM | BODY MASS INDEX: 24.3 KG/M2 | WEIGHT: 154 LBS | TEMPERATURE: 98.6 F | SYSTOLIC BLOOD PRESSURE: 140 MMHG

## 2018-05-31 DIAGNOSIS — M79.645 PAIN IN FINGER OF BOTH HANDS: ICD-10-CM

## 2018-05-31 DIAGNOSIS — M79.644 PAIN IN FINGER OF BOTH HANDS: ICD-10-CM

## 2018-05-31 DIAGNOSIS — G89.29 OTHER CHRONIC PAIN: Primary | ICD-10-CM

## 2018-05-31 PROCEDURE — 99213 OFFICE O/P EST LOW 20 MIN: CPT | Performed by: PHYSICIAN ASSISTANT

## 2018-05-31 RX ORDER — HYDROCODONE BITARTRATE AND ACETAMINOPHEN 5; 325 MG/1; MG/1
1 TABLET ORAL EVERY 4 HOURS PRN
Qty: 4 TABLET | Refills: 0 | Status: SHIPPED | OUTPATIENT
Start: 2018-05-31 | End: 2018-07-02

## 2018-05-31 RX ORDER — HYDROCODONE BITARTRATE AND ACETAMINOPHEN 5; 325 MG/1; MG/1
TABLET ORAL
Qty: 30 TABLET | Refills: 0 | Status: CANCELLED | OUTPATIENT
Start: 2018-05-31

## 2018-05-31 ASSESSMENT — ENCOUNTER SYMPTOMS: CONSTITUTIONAL NEGATIVE: 1

## 2018-05-31 ASSESSMENT — PAIN SCALES - GENERAL: PAINLEVEL: EXTREME PAIN (9)

## 2018-05-31 NOTE — TELEPHONE ENCOUNTER
Patient is calling to request her Hydrocodone and to update the nurse and Dr. Hoskins about her appointments.     [facility]

## 2018-05-31 NOTE — TELEPHONE ENCOUNTER
Pt states she has been taking Hydrocodone for hand pain. Saw neuro and is setting up EMG. States she is currently taking Gabapentin and Topamax. Titrating Topamax up and then will stop Gabapentin. States in the mean time she has been taking Hydrocodone for breakthrough pain. States she is in need of a refill of Hydrocodone.

## 2018-05-31 NOTE — TELEPHONE ENCOUNTER
Pt called back. If script is done today please call her and let her know, her adult son will come and pick it up. Sasha Watts RN

## 2018-05-31 NOTE — NURSING NOTE
"Chief Complaint   Patient presents with     Musculoskeletal Problem     Bilateral arm and hand pain.  Has been going on for about 2 years.  Is on Gabapentin and Vicodin for break through pain. Recently started Topamax though is now out and in a lot of pain.         Initial /72 (BP Location: Right arm, Cuff Size: Adult Regular)  Temp 98.6  F (37  C) (Tympanic)  Resp 14  Wt 154 lb (69.9 kg)  LMP 05/15/2018 (Approximate)  BMI 24.3 kg/m2 Estimated body mass index is 24.3 kg/(m^2) as calculated from the following:    Height as of 3/7/18: 5' 6.75\" (1.695 m).    Weight as of this encounter: 154 lb (69.9 kg).      Health Maintenance that is potentially due pending provider review:  NONE    n/a    Is there anyone who you would like to be able to receive your results? Not Applicable  If yes have patient fill out LORI  Radha Cm M.A.          "

## 2018-05-31 NOTE — TELEPHONE ENCOUNTER
Patient notified of message below. Patient expressed her dislike of the message and hung up the phone.    Lakeisha Her   [facility] [place]

## 2018-05-31 NOTE — MR AVS SNAPSHOT
"              After Visit Summary   5/31/2018    Ariadne Lowry    MRN: [id number]           Patient Information     Date Of Birth          1975        Visit Information        Provider Department      5/31/2018 5:55 PM Lizet Addison PA-C [facility]        Today's Diagnoses     Other chronic pain    -  1      Care Instructions    Follow up at [facility] tomorrow as scheduled with   Continue with your medications  4 tablets of Norco were prescribed to you, no driving or alcohol with this medication  No working while taking this medication  You cannot return to Urgent Care for refills          Follow-ups after your visit        Your next 10 appointments already scheduled     Jun 01, 2018 11:20 AM CDT   SHORT with Jose G Chambers MD   [facility]    [address] [place] [id number] [phone]              Who to contact     If you have questions or need follow up information about today's clinic visit or your schedule please contact [facility] directly at [phone].  Normal or non-critical lab and imaging results will be communicated to you by MyChart, letter or phone within 4 business days after the clinic has received the results. If you do not hear from us within 7 days, please contact the clinic through MyChart or phone. If you have a critical or abnormal lab result, we will notify you by phone as soon as possible.  Submit refill requests through [facility] or call your pharmacy and they will forward the refill request to us. Please allow 3 business days for your refill to be completed.          Additional Information About Your Visit        MyChart Information     [facility] lets you send messages to your doctor, view your test results, renew your prescriptions, schedule appointments and more. To sign up, go to [email or web address] . Click on \"Log in\" on the " "left side of the screen, which will take you to the Welcome page. Then click on \"Sign up Now\" on the right side of the page.     You will be asked to enter the access code listed below, as well as some personal information. Please follow the directions to create your username and password.     Your access code is: [email or web address]  Expires: 2018  4:30 PM     Your access code will  in 90 days. If you need help or a new code, please call your [facility] or [phone].        Care EveryWhere ID     This is your Care EveryWhere ID. This could be used by other organizations to access your [facility] medical records  [id number]        Your Vitals Were     Temperature Respirations Last Period BMI (Body Mass Index)          98.6  F (37  C) (Tympanic) 14 05/15/2018 (Approximate) 24.3 kg/m2         Blood Pressure from Last 3 Encounters:   18 140/72   18 119/77   18 136/82    Weight from Last 3 Encounters:   18 154 lb (69.9 kg)   18 154 lb 6.4 oz (70 kg)   18 151 lb 3.2 oz (68.6 kg)              Today, you had the following     No orders found for display         Today's Medication Changes          These changes are accurate as of 18  7:30 PM.  If you have any questions, ask your nurse or doctor.               These medicines have changed or have updated prescriptions.        Dose/Directions    * HYDROcodone-acetaminophen 5-325 MG per tablet   Commonly known as:  NORCO   This may have changed:  Another medication with the same name was added. Make sure you understand how and when to take each.   Used for:  Pain in finger of both hands   Changed by:  Lizet Addison PA-C        1 tab bid prn pain   Quantity:  30 tablet   Refills:  0       * HYDROcodone-acetaminophen 5-325 MG per tablet   Commonly known as:  NORCO   This may have changed:  You were already taking a medication with the same name, and this prescription was added. Make sure you understand how and when to take " each.   Used for:  Other chronic pain   Changed by:  Lizet Addison PA-C        Dose:  1 tablet   Take 1 tablet by mouth every 4 hours as needed for pain   Quantity:  4 tablet   Refills:  0       topiramate 25 MG tablet   Commonly known as:  TOPAMAX   This may have changed:    - how much to take  - when to take this  - additional instructions   Used for:  Pain in both hands, Paresthesias        Take 1 tablet (25 mg) at bedtime for 1 week, then 1 tablet twice daily for 1 week, then 1 tablet in AM and 2 in PM for 1 week, then 2 tablets twice daily.   Quantity:  120 tablet   Refills:  3       * Notice:  This list has 2 medication(s) that are the same as other medications prescribed for you. Read the directions carefully, and ask your doctor or other care provider to review them with you.         Where to get your medicines      Some of these will need a paper prescription and others can be bought over the counter.  Ask your nurse if you have questions.     Bring a paper prescription for each of these medications     HYDROcodone-acetaminophen 5-325 MG per tablet               Information about OPIOIDS     PRESCRIPTION OPIOIDS: WHAT YOU NEED TO KNOW   You have a prescription for an opioid (narcotic) pain medicine. Opioids can cause addiction. If you have a history of chemical dependency of any type, you are at a higher risk of becoming addicted to opioids. Only take this medicine after all other options have been tried. Take it for as short a time and as few doses as possible.     Do not:    Drive. If you drive while taking these medicines, you could be arrested for driving under the influence (DUI).    Operate heavy machinery    Do any other dangerous activities while taking these medicines.     Drink any alcohol while taking these medicines.      Take with any other medicines that contain acetaminophen. Read all labels carefully. Look for the word  acetaminophen  or  Tylenol.  Ask your pharmacist if you have  questions or are unsure.    Store your pills in a secure place, locked if possible. We will not replace any lost or stolen medicine. If you don t finish your medicine, please throw away (dispose) as directed by your pharmacist. The [place] Pollution Control Agency has more information about safe disposal: https://www.pca.[facility].mn.us/living-green/managing-unwanted-medications    All opioids tend to cause constipation. Drink plenty of water and eat foods that have a lot of fiber, such as fruits, vegetables, prune juice, apple juice and high-fiber cereal. Take a laxative (Miralax, milk of magnesia, Colace, Senna) if you don t move your bowels at least every other day.          Primary Care Provider Office Phone # Fax #    Young Hoskins [phone] [address] [place] [address]        Equal Access to Services     [facility]: Hadii aad ku hadasho Sodesire, waaxda luqadaha, qaybta kaalmada adejoceyada, karrie aragon . [facility] [phone].    ATENCIÓN: Si habla español, tiene a mckenzie disposición servicios gratuitos de asistencia lingüística. Rito al [phone].    We comply with applicable federal civil rights laws and [place] laws. We do not discriminate on the basis of race, color, national origin, age, disability, sex, sexual orientation, or gender identity.            Thank you!     Thank you for choosing [facility]  for your care. Our goal is always to provide you with excellent care. Hearing back from our patients is one way we can continue to improve our services. Please take a few minutes to complete the written survey that you may receive in the mail after your visit with us. Thank you!             Your Updated Medication List - Protect others around you: Learn how to safely use, store and throw away your medicines at www.disposemymeds.org.          This list is accurate as of 5/31/18  7:30 PM.  Always use your  most recent med list.                   Brand Name Dispense Instructions for use Diagnosis    gabapentin 300 MG capsule    NEURONTIN    540 capsule    TAKE 2 CAPSULES(600 MG) BY MOUTH THREE TIMES DAILY    Neuropathy       * HYDROcodone-acetaminophen 5-325 MG per tablet    NORCO    30 tablet    1 tab bid prn pain    Pain in finger of both hands       * HYDROcodone-acetaminophen 5-325 MG per tablet    NORCO    4 tablet    Take 1 tablet by mouth every 4 hours as needed for pain    Other chronic pain       sertraline 50 MG tablet    ZOLOFT    90 tablet    Take 1 tablet (50 mg) by mouth daily    Adjustment disorder with depressed mood       SUMAtriptan 100 MG tablet    IMITREX    12 tablet    Take 1 tablet (100 mg) by mouth at onset of headache for migraine May repeat in 2 hours. Max 2 tablets/24 hours.    Migraine without status migrainosus, not intractable, unspecified migraine type       topiramate 25 MG tablet    TOPAMAX    120 tablet    Take 1 tablet (25 mg) at bedtime for 1 week, then 1 tablet twice daily for 1 week, then 1 tablet in AM and 2 in PM for 1 week, then 2 tablets twice daily.    Pain in both hands, Paresthesias       * Notice:  This list has 2 medication(s) that are the same as other medications prescribed for you. Read the directions carefully, and ask your doctor or other care provider to review them with you.

## 2018-06-01 ENCOUNTER — TELEPHONE (OUTPATIENT)
Dept: NEUROLOGY | Facility: CLINIC | Age: 43
End: 2018-06-01

## 2018-06-01 ENCOUNTER — OFFICE VISIT (OUTPATIENT)
Dept: FAMILY MEDICINE | Facility: CLINIC | Age: 43
End: 2018-06-01
Payer: COMMERCIAL

## 2018-06-01 VITALS
SYSTOLIC BLOOD PRESSURE: 132 MMHG | TEMPERATURE: 98 F | HEART RATE: 95 BPM | BODY MASS INDEX: 24.67 KG/M2 | DIASTOLIC BLOOD PRESSURE: 84 MMHG | WEIGHT: 157.2 LBS | OXYGEN SATURATION: 98 % | HEIGHT: 67 IN

## 2018-06-01 DIAGNOSIS — M79.641 BILATERAL HAND PAIN: Primary | ICD-10-CM

## 2018-06-01 DIAGNOSIS — M50.30 DDD (DEGENERATIVE DISC DISEASE), CERVICAL: ICD-10-CM

## 2018-06-01 DIAGNOSIS — M79.631 PAIN IN BOTH FOREARMS: ICD-10-CM

## 2018-06-01 DIAGNOSIS — M79.642 BILATERAL HAND PAIN: Primary | ICD-10-CM

## 2018-06-01 DIAGNOSIS — M79.632 PAIN IN BOTH FOREARMS: ICD-10-CM

## 2018-06-01 PROCEDURE — 99214 OFFICE O/P EST MOD 30 MIN: CPT | Performed by: FAMILY MEDICINE

## 2018-06-01 NOTE — TELEPHONE ENCOUNTER
"Called pt. Pt pressured with speech but clear in thought and conversation. Pt indicated her anxiety related to her PCP clinic closing and then finding out without warning that her provider (who prescribes her regular chronic pain medication prescriptions) will not be back in clinic until September.  Discussed with patient that [facility]'s desire is to absorb the [facility] patients into NB and PC clinics seamlessly as possible.  Suggested she could call the [facility] Manger and discuss how she is feeling after today's visit with a covering FP provider.    Advised pt:  Dr Lyons is not physically in clinic on Fridays and therefore would not be able to consider writing a prescription for Narcotic pain medication even if she would be willing to provide a covering or short term rx.  But I could however see if she is working remotely and if so:   I would let her know that pt reports her Topamax titration has not provided any relief after about 2 weeks of being on the med.  I would let her know that her current dose of Gabapentin is not covering enough and was still taking about 4 Norco a day for breakthrough pain.  Pt has contacted / left message scheduling for the EMG's however the  has not called her back yet. (Works M-W-F).    Pt states Advil will not help with the pain and stated she would settle for anything that would help (has taken non narcotic med in the past and it was sufficient/ \"better than nothing\") she stated she is not necessarily asking for narcotics.    Verified she has been on Tramadol with some relief until visit with Dr Hoskins last March: his note indicates  \"Not sure what long term plan is on pain medication at this point.  Told her that if we don't get an answer for her pain, and it's bad enough she feels she needs opioids, we would need to get pain clinic involved\".    Per patient explanation of understanding,   provider at [facility] today was not willing to provide Narcotic pain med " "due to no definitive diagnosis found. Pt states nothing non narcotic was  offered or discussed to use in place of Opiate.  Just Advil and explained at the time it was not sufficient for break through pain.   And she states \"no one has sent her to the pain clinic with refer yet\"  (\"just say if no diagnosis is found\").    Sending update to Dr Lyons about med not working yet and if any further advise for patient.    Janine Obrien RN  [place]  [facility]  "

## 2018-06-01 NOTE — MR AVS SNAPSHOT
"              After Visit Summary   6/1/2018    Ariadne Lowry    MRN: [id number]           Patient Information     Date Of Birth          1975        Visit Information        Provider Department      6/1/2018 11:20 AM Jose G Chambers MD [facility]        Today's Diagnoses     Bilateral hand pain    -  1    DDD (degenerative disc disease), cervical        Pain in both forearms           Follow-ups after your visit        Future tests that were ordered for you today     Open Future Orders        Priority Expected Expires Ordered    XR Hand Bilateral G/E 3 Views STAT 6/1/2018 6/1/2019 6/1/2018    XR Forearm Bilateral 2 Views STAT 6/1/2018 6/1/2019 6/1/2018            Who to contact     If you have questions or need follow up information about today's clinic visit or your schedule please contact [facility] directly at [phone].  Normal or non-critical lab and imaging results will be communicated to you by MyChart, letter or phone within 4 business days after the clinic has received the results. If you do not hear from us within 7 days, please contact the clinic through MyChart or phone. If you have a critical or abnormal lab result, we will notify you by phone as soon as possible.  Submit refill requests through [employer] or call your pharmacy and they will forward the refill request to us. Please allow 3 business days for your refill to be completed.          Additional Information About Your Visit        MyChart Information     [employer] lets you send messages to your doctor, view your test results, renew your prescriptions, schedule appointments and more. To sign up, go to [email or web address] . Click on \"Log in\" on the left side of the screen, which will take you to the Welcome page. Then click on \"Sign up Now\" on the right side of the page.     You will be asked to enter the access code listed below, as well as some personal information. Please follow the directions to " "create your username and password.     Your access code is: [email or web address]  Expires: 2018  4:30 PM     Your access code will  in 90 days. If you need help or a new code, please call your [place] clinic or [phone].        Care EveryWhere ID     This is your Care EveryWhere ID. This could be used by other organizations to access your [place] medical records  [id number]        Your Vitals Were     Pulse Temperature Height Last Period Pulse Oximetry BMI (Body Mass Index)    95 98  F (36.7  C) (Tympanic) 5' 6.75\" (1.695 m) 05/15/2018 (Approximate) 98% 24.81 kg/m2       Blood Pressure from Last 3 Encounters:   18 132/84   18 140/72   18 119/77    Weight from Last 3 Encounters:   18 157 lb 3.2 oz (71.3 kg)   18 154 lb (69.9 kg)   18 154 lb 6.4 oz (70 kg)                 Today's Medication Changes          These changes are accurate as of 18 12:02 PM.  If you have any questions, ask your nurse or doctor.               Start taking these medicines.        Dose/Directions    diclofenac 1 % Gel topical gel   Commonly known as:  VOLTAREN   Used for:  Bilateral hand pain, Pain in both forearms   Started by:  Jose G Chambers MD        Apply 2 grams to hands and forearms four times daily PRN using enclosed dosing card.   Quantity:  100 g   Refills:  1         These medicines have changed or have updated prescriptions.        Dose/Directions    topiramate 25 MG tablet   Commonly known as:  TOPAMAX   This may have changed:    - how much to take  - when to take this  - additional instructions   Used for:  Pain in both hands, Paresthesias        Take 1 tablet (25 mg) at bedtime for 1 week, then 1 tablet twice daily for 1 week, then 1 tablet in AM and 2 in PM for 1 week, then 2 tablets twice daily.   Quantity:  120 tablet   Refills:  3            Where to get your medicines      These medications were sent to [facility] [address] [place] [address] AT [facility] " of [address]  [address] [facility] [address]     Phone:  [phone]     diclofenac 1 % Gel topical gel                Primary Care Provider Office Phone # Fax #    Young Hoskins [phone] [address]  [facility] [address]        Equal Access to Services     [facility] : Hadii aad ku hadasho Soomaali, waaxda luqadaha, qaybta kaalmada adeegyada, waxay idiin hayaan adeeg kharash la'aan . So [facility] [phone].    ATENCIÓN: Si habla español, tiene a mckenzie disposición servicios gratuitos de asistencia lingüística. Llame al [phone].    We comply with applicable federal civil rights laws and [place] laws. We do not discriminate on the basis of race, color, national origin, age, disability, sex, sexual orientation, or gender identity.            Thank you!     Thank you for choosing [facility]  for your care. Our goal is always to provide you with excellent care. Hearing back from our patients is one way we can continue to improve our services. Please take a few minutes to complete the written survey that you may receive in the mail after your visit with us. Thank you!             Your Updated Medication List - Protect others around you: Learn how to safely use, store and throw away your medicines at www.disposemymeds.org.          This list is accurate as of 6/1/18 12:02 PM.  Always use your most recent med list.                   Brand Name Dispense Instructions for use Diagnosis    diclofenac 1 % Gel topical gel    VOLTAREN    100 g    Apply 2 grams to hands and forearms four times daily PRN using enclosed dosing card.    Bilateral hand pain, Pain in both forearms       gabapentin 300 MG capsule    NEURONTIN    540 capsule    TAKE 2 CAPSULES(600 MG) BY MOUTH THREE TIMES DAILY    Neuropathy       * HYDROcodone-acetaminophen 5-325 MG per tablet    NORCO    30 tablet    1 tab bid prn pain    Pain in finger of both hands       *  HYDROcodone-acetaminophen 5-325 MG per tablet    NORCO    4 tablet    Take 1 tablet by mouth every 4 hours as needed for pain    Other chronic pain       sertraline 50 MG tablet    ZOLOFT    90 tablet    Take 1 tablet (50 mg) by mouth daily    Adjustment disorder with depressed mood       SUMAtriptan 100 MG tablet    IMITREX    12 tablet    Take 1 tablet (100 mg) by mouth at onset of headache for migraine May repeat in 2 hours. Max 2 tablets/24 hours.    Migraine without status migrainosus, not intractable, unspecified migraine type       topiramate 25 MG tablet    TOPAMAX    120 tablet    Take 1 tablet (25 mg) at bedtime for 1 week, then 1 tablet twice daily for 1 week, then 1 tablet in AM and 2 in PM for 1 week, then 2 tablets twice daily.    Pain in both hands, Paresthesias       * Notice:  This list has 2 medication(s) that are the same as other medications prescribed for you. Read the directions carefully, and ask your doctor or other care provider to review them with you.

## 2018-06-01 NOTE — PROGRESS NOTES
SUBJECTIVE:   Ariadne Lowry is a 43 year old female who presents to clinic today for the following health issues:    Hand Pain   Medication Followup of Hydrocodone    Taking Medication as prescribed: yes    Side Effects:  None    Medication Helping Symptoms:  Takes the edge off     Patient has been having bilateral hand, wrist and forearm pain for about 2 years now.  He was investigation has been unremarkable except some degenerative cervical disc disease, which does not correlate with her symptoms.  Patient was recently seen by neurology, repeat no conduction studies was ordered.  Patient has tried on multiple medications without any significant relief, although Norco takes the edge of somewhat.      Problem list and histories reviewed & adjusted, as indicated.  Additional history: as documented    Patient Active Problem List   Diagnosis     Cervical radicular pain     Migraine without status migrainosus, not intractable, unspecified migraine type     Bilateral carpal tunnel syndrome     Adjustment disorder with depressed mood     History reviewed. No pertinent surgical history.    Social History   Substance Use Topics     Smoking status: Former Smoker     Quit date: 11/9/2004     Smokeless tobacco: Never Used     Alcohol use No     Family History   Problem Relation Age of Onset     Seasonal/Environmental Allergies Mother      Thyroid Disease Mother      HEART DISEASE Father      Neurologic Disorder Father      HEART DISEASE Maternal Grandfather      Alcoholism Paternal Grandfather      Depression Daughter      Psychotic Disorder Daughter          Current Outpatient Prescriptions   Medication Sig Dispense Refill     diclofenac (VOLTAREN) 1 % GEL topical gel Apply 2 grams to hands and forearms four times daily PRN using enclosed dosing card. 100 g 1     gabapentin (NEURONTIN) 300 MG capsule TAKE 2 CAPSULES(600 MG) BY MOUTH THREE TIMES DAILY 540 capsule 5     HYDROcodone-acetaminophen (NORCO) 5-325 MG per tablet  "Take 1 tablet by mouth every 4 hours as needed for pain 4 tablet 0     sertraline (ZOLOFT) 50 MG tablet Take 1 tablet (50 mg) by mouth daily 90 tablet 1     SUMAtriptan (IMITREX) 100 MG tablet Take 1 tablet (100 mg) by mouth at onset of headache for migraine May repeat in 2 hours. Max 2 tablets/24 hours. 12 tablet 11     topiramate (TOPAMAX) 25 MG tablet Take 1 tablet (25 mg) at bedtime for 1 week, then 1 tablet twice daily for 1 week, then 1 tablet in AM and 2 in PM for 1 week, then 2 tablets twice daily. (Patient taking differently: 50 mg daily Take 1 tablet (25 mg) at bedtime for 1 week, then 1 tablet twice daily for 1 week, then 1 tablet in AM and 2 in PM for 1 week, then 2 tablets twice daily.) 120 tablet 3     HYDROcodone-acetaminophen (NORCO) 5-325 MG per tablet 1 tab bid prn pain (Patient not taking: Reported on 6/1/2018) 30 tablet 0     No Known Allergies  Recent Labs   Lab Test  03/07/18   1658  01/29/18   1807   ALT   --   24   CR   --   0.77   GFRESTIMATED   --   82   GFRESTBLACK   --   >90   POTASSIUM   --   4.8   TSH  3.11   --       BP Readings from Last 3 Encounters:   06/01/18 132/84   05/31/18 140/72   05/22/18 119/77    Wt Readings from Last 3 Encounters:   06/01/18 157 lb 3.2 oz (71.3 kg)   05/31/18 154 lb (69.9 kg)   05/22/18 154 lb 6.4 oz (70 kg)                  Labs reviewed in EPIC    Reviewed and updated as needed this visit by clinical staff       Reviewed and updated as needed this visit by Provider         ROS:  Constitutional, HEENT, cardiovascular, pulmonary, GI, , musculoskeletal, neuro, skin, endocrine and psych systems are negative, except as otherwise noted.    OBJECTIVE:     /84  Pulse 95  Temp 98  F (36.7  C) (Tympanic)  Ht 5' 6.75\" (1.695 m)  Wt 157 lb 3.2 oz (71.3 kg)  LMP 05/15/2018 (Approximate)  SpO2 98%  BMI 24.81 kg/m2  Body mass index is 24.81 kg/(m^2).  GENERAL: healthy, alert and no distress  EYES: Eyes grossly normal to inspection, PERRL and conjunctivae " and sclerae normal  HENT: ear canals and TM's normal, nose and mouth without ulcers or lesions  NECK: no adenopathy, no asymmetry, masses, or scars and thyroid normal to palpation  RESP: lungs clear to auscultation - no rales, rhonchi or wheezes  CV: regular rate and rhythm, normal S1 S2, no S3 or S4, no murmur, click or rub, no peripheral edema and peripheral pulses strong  ABDOMEN: soft, nontender, no hepatosplenomegaly, no masses and bowel sounds normal  MS: Subjective bilateral wrist, forearm pain, no skin discoloration, swelling or warmth noted, range of movement normal, reflexes 2+, normal capillary refill, pulses 3+, normal cervical spine exam.  SKIN: no suspicious lesions or rashes  NEURO: Normal strength and tone, mentation intact and speech normal      ASSESSMENT/PLAN:         ICD-10-CM    1. Bilateral hand pain M79.641 XR Hand Bilateral G/E 3 Views    M79.642 diclofenac (VOLTAREN) 1 % GEL topical gel   2. DDD (degenerative disc disease), cervical M50.30    3. Pain in both forearms M79.632 XR Hand Bilateral G/E 3 Views    M79.631 XR Forearm Bilateral 2 Views     diclofenac (VOLTAREN) 1 % GEL topical gel       Discussed in detail differentials and further management.  Currently her symptoms are unexplainable due to any specific diagnosis.  Patient has been following neurology, yet has to schedule the nerve condition studies which was ordered at last appointment by Dr. Lyons.  Suggested to use diclofenac gel and over-the-counter NSAIDs for pain control.  X-rays ordered to rule out any bone pathology, x-ray machine down, x-ray tech will get in touch with the patient once the machine gets fixed.  Previous MRI cervical spine results reviewed.  Explained that long-term opioids is not indicated without knowing the underlying diagnosis, associated risk explained including dependence, abuse, addiction, tolerance and cardiorespiratory depression.  Patient stated that she has been getting opioids from Dr. Hoskins for  many months.  Mentioned that it's not standard practice to provide opioid long-term without knowing the underlying diagnosis.  Patient did not seem to understand my explanation and started yelling/crying.  I closed encounter to stop any further arguments. I spent 25 minutes during this encounter, greater than 50% of the time was spent on education, counseling, reviewing the plan of care, and coordination in regards to her symptoms.           Jose G Chambers MD  [facility]

## 2018-06-01 NOTE — PROGRESS NOTES
HPI  Chief Complaint   Patient presents with     Musculoskeletal Problem     Bilateral arm and hand pain.  Has been going on for about 2 years.  Is on Gabapentin and Vicodin for break through pain. Recently started Topamax though is now out and in a lot of pain.       The pt presents to  for medication refill. She is requesting Hydrocodone-APAP for chronic bilateral hand pain. She tells me she is followed by Dr. Hoskins at the [facility] (which will now by at the [place] location). She called clinic today and found out he will be on leave until 9/2018. She asked for refill and was given appt with different PCP for Monday. She tells me she ran out of her medication last night. Her pain is constant. She is on gabapentin and imitrex for pain, and does not feel like either work. She has seen neurology and actually called for refill, but was told he does not refill medication. No new injury or change in pain.     /72 (BP Location: Right arm, Cuff Size: Adult Regular)  Temp 98.6  F (37  C) (Tympanic)  Resp 14  Wt 154 lb (69.9 kg)  LMP 05/15/2018 (Approximate)  BMI 24.3 kg/m2    Review of Systems   Constitutional: Negative.    Musculoskeletal:        Chronic hand pain, bilateral         Physical Exam   Constitutional: She is well-developed, well-nourished, and in no distress. No distress.   Cardiovascular:   normal   Pulmonary/Chest:   normal   Skin: No rash noted. She is not diaphoretic.       Assessment/Plan:    Pt presents to  for narcotic refill. She tried to call clinic and found out her PCP will be gone for the next 3 months, and was given appt for Monday 3 days from now. She tells me last dose was last night, and she cannot get through 3 more nights without pain. No change in pain, no new trauma. No swelling or change in appearance of pain. She is taking gabapentin and imitrex for pain. After reviewing her chart, this is chronic pain, that has been going on for over 2 years. Is followed by  neurology as well. Her most recent Rx was 5/3 and she was prescribed 30 tablets. I discussed  policy on medication refills of any medication including narcotics. I was able to schedule appt with different PCP at the [facility] for tomorrow at 11:20 AM. I did prescribe 4 tablets of Norco for tonight. Discussed that she cannot return to UC for refills and will need to see PCP for any additional pain management. Seek urgent evaluation with change in pain/worsening pain.

## 2018-06-01 NOTE — PATIENT INSTRUCTIONS
Follow up at [facility] tomorrow as scheduled with   Continue with your medications  4 tablets of Norco were prescribed to you, no driving or alcohol with this medication  No working while taking this medication  You cannot return to Urgent Care for refills

## 2018-06-01 NOTE — TELEPHONE ENCOUNTER
Pt called stating that she had seen Dr. Lyons on 05/22/18 regarding pain in her hand. Pt states that she needs a refill on her hydrocodone and has been seen with other providers in clinic and UC and has not yet had a provider able and/or willing to prescribe hydrocodone for pt. Pt also states that her PCP is out on leave until 09/2018. Pt states that the pain is very bad. Please advise.    Brandie Watts  [facility]

## 2018-06-03 NOTE — TELEPHONE ENCOUNTER
I am not comfortable filling her narcotics. This really needs to come from the initiating provider. I am willing to go up on the Topamax if she would like. If the pain is that bad, her solution is consultation in pain clinic (no neurologic cause found yet, though I was going to have her repeat the NCV).   Let me know if she wants to move forward with pain referral.     CY

## 2018-06-04 ENCOUNTER — OFFICE VISIT (OUTPATIENT)
Dept: FAMILY MEDICINE | Facility: CLINIC | Age: 43
End: 2018-06-04
Payer: COMMERCIAL

## 2018-06-04 VITALS
BODY MASS INDEX: 24.17 KG/M2 | HEART RATE: 80 BPM | WEIGHT: 154 LBS | SYSTOLIC BLOOD PRESSURE: 120 MMHG | DIASTOLIC BLOOD PRESSURE: 80 MMHG | RESPIRATION RATE: 16 BRPM | TEMPERATURE: 98.2 F | HEIGHT: 67 IN

## 2018-06-04 DIAGNOSIS — M79.645 PAIN IN FINGER OF BOTH HANDS: ICD-10-CM

## 2018-06-04 DIAGNOSIS — F43.21 ADJUSTMENT DISORDER WITH DEPRESSED MOOD: ICD-10-CM

## 2018-06-04 DIAGNOSIS — M79.644 PAIN IN FINGER OF BOTH HANDS: ICD-10-CM

## 2018-06-04 LAB
CRP SERPL-MCNC: 4.4 MG/L (ref 0–8)
ERYTHROCYTE [DISTWIDTH] IN BLOOD BY AUTOMATED COUNT: 13.3 % (ref 10–15)
ERYTHROCYTE [SEDIMENTATION RATE] IN BLOOD BY WESTERGREN METHOD: 9 MM/H (ref 0–20)
HCT VFR BLD AUTO: 42 % (ref 35–47)
HGB BLD-MCNC: 14.4 G/DL (ref 11.7–15.7)
MCH RBC QN AUTO: 32 PG (ref 26.5–33)
MCHC RBC AUTO-ENTMCNC: 34.3 G/DL (ref 31.5–36.5)
MCV RBC AUTO: 93 FL (ref 78–100)
PLATELET # BLD AUTO: 273 10E9/L (ref 150–450)
RBC # BLD AUTO: 4.5 10E12/L (ref 3.8–5.2)
VIT B12 SERPL-MCNC: 600 PG/ML (ref 193–986)
WBC # BLD AUTO: 8.4 10E9/L (ref 4–11)

## 2018-06-04 PROCEDURE — 99214 OFFICE O/P EST MOD 30 MIN: CPT | Performed by: FAMILY MEDICINE

## 2018-06-04 PROCEDURE — 85652 RBC SED RATE AUTOMATED: CPT | Performed by: FAMILY MEDICINE

## 2018-06-04 PROCEDURE — 86038 ANTINUCLEAR ANTIBODIES: CPT | Performed by: FAMILY MEDICINE

## 2018-06-04 PROCEDURE — 85027 COMPLETE CBC AUTOMATED: CPT | Performed by: FAMILY MEDICINE

## 2018-06-04 PROCEDURE — 82607 VITAMIN B-12: CPT | Performed by: FAMILY MEDICINE

## 2018-06-04 PROCEDURE — 36415 COLL VENOUS BLD VENIPUNCTURE: CPT | Performed by: FAMILY MEDICINE

## 2018-06-04 PROCEDURE — 86140 C-REACTIVE PROTEIN: CPT | Performed by: FAMILY MEDICINE

## 2018-06-04 RX ORDER — HYDROCODONE BITARTRATE AND ACETAMINOPHEN 5; 325 MG/1; MG/1
TABLET ORAL
Qty: 30 TABLET | Refills: 0 | Status: SHIPPED | OUTPATIENT
Start: 2018-06-04 | End: 2019-05-20

## 2018-06-04 ASSESSMENT — ANXIETY QUESTIONNAIRES
2. NOT BEING ABLE TO STOP OR CONTROL WORRYING: NOT AT ALL
1. FEELING NERVOUS, ANXIOUS, OR ON EDGE: NOT AT ALL
GAD7 TOTAL SCORE: 1
3. WORRYING TOO MUCH ABOUT DIFFERENT THINGS: NOT AT ALL
6. BECOMING EASILY ANNOYED OR IRRITABLE: SEVERAL DAYS
7. FEELING AFRAID AS IF SOMETHING AWFUL MIGHT HAPPEN: NOT AT ALL
5. BEING SO RESTLESS THAT IT IS HARD TO SIT STILL: NOT AT ALL

## 2018-06-04 ASSESSMENT — PAIN SCALES - GENERAL: PAINLEVEL: MODERATE PAIN (4)

## 2018-06-04 ASSESSMENT — PATIENT HEALTH QUESTIONNAIRE - PHQ9: 5. POOR APPETITE OR OVEREATING: NOT AT ALL

## 2018-06-04 NOTE — MR AVS SNAPSHOT
After Visit Summary   6/4/2018    Ariadne Lowry    MRN: [id number]           Patient Information     Date Of Birth          1975        Visit Information        Provider Department      6/4/2018 11:20 AM Shiva Hand MD [facility]        Today's Diagnoses     Pain in finger of both hands        Adjustment disorder with depressed mood          Care Instructions    ASSESSMENT:     ICD-10-CM    1. Pain in finger of both hands M79.645 PAIN MANAGEMENT REFERRAL    M79.644 HYDROcodone-acetaminophen (NORCO) 5-325 MG per tablet   2. Adjustment disorder with depressed mood F43.21 sertraline (ZOLOFT) 50 MG tablet      I have refilled the hydrocodone/acetaminophen 5/325mg to take sparingly.  I will not refill before a month.    Continue increasing the Topamax as planned.   EMG next week.  Schedule an appointment with pain clinic.   Check blood tests for inflammatory condition.   Increase your sertraline to 100mg (two of the 50mg pills) daily.   Recheck in a month.             Follow-ups after your visit        Additional Services     PAIN MANAGEMENT REFERRAL       Your provider has referred you to: [facility] [place]: [facility] -    Reason for Referral: Evaluation for comprehensive services- patients will be evaluated if appropriate for comprehensive service including medication changes, procedures, pain psychology, and pain physical therapy.  While involved with comprehensive services, pain providers will work with referring provider/PCP to stabilize appropriate medication management, with long-term plan of transition of prescribing back to referring provider/PCP upon completion of comprehensive services.      Please complete the following questions:    Do you have any specific questions for the pain specialist? No    Are there any red flags that may impact the assessment or management of the patient? None      What is your diagnosis for the patient's pain?   bilateral arm and  hand pain so far unexplained.       For any questions, contact the [facility] at [phone].     **ANY DIAGNOSTIC TESTS THAT ARE NOT IN EPIC SHOULD BE SENT TO THE PAIN CENTER**    REGARDING OPIOID MEDICATIONS:  The discussion of opioids management, appropriateness of therapy, and dosing will be discussed in patients being seen for evaluation.  The pain management clinics are not long-term prescribing clinics, with transition of prescribing of medications ultimately going back to the referring provider/PCP.  If prescribing is taken over at the pain clinic, it is in actively involved patients whom are appropriate for opioids, urine drug screening is completed, and long-term prescribing plan has been determined.  Therefore, we will not be automatically taking over prescribing at the patient's first visit.  Is this agreeable to you? agrees.     Please be aware that coverage of these services is subject to the terms and limitations of your health insurance plan.  Call member services at your health plan with any benefit or coverage questions.      Please bring the following with you to your appointment:    (1) Any X-Rays, CTs or MRIs which have been performed.  Contact the facility where they were done to arrange for  prior to your scheduled appointment.    (2) List of current medications   (3) This referral request   (4) Any documents/labs given to you for this referral                  Who to contact     If you have questions or need follow up information about today's clinic visit or your schedule please contact [facility] directly at [phone].  Normal or non-critical lab and imaging results will be communicated to you by MyChart, letter or phone within 4 business days after the clinic has received the results. If you do not hear from us within 7 days, please contact the clinic through MyChart or phone. If you have a critical or abnormal lab result, we will notify  "you by phone as soon as possible.  Submit refill requests through [employer] or call your pharmacy and they will forward the refill request to us. Please allow 3 business days for your refill to be completed.          Additional Information About Your Visit        [employer] Information     [employer] lets you send messages to your doctor, view your test results, renew your prescriptions, schedule appointments and more. To sign up, go to [email or web address] . Click on \"Log in\" on the left side of the screen, which will take you to the Welcome page. Then click on \"Sign up Now\" on the right side of the page.     You will be asked to enter the access code listed below, as well as some personal information. Please follow the directions to create your username and password.     Your access code is: [email or web address]  Expires: 2018  4:30 PM     Your access code will  in 90 days. If you need help or a new code, please call your [place] clinic or [phone].        Care EveryWhere ID     This is your Care EveryWhere ID. This could be used by other organizations to access your [place] medical records  [id number]        Your Vitals Were     Pulse Temperature Respirations Height Last Period BMI (Body Mass Index)    80 98.2  F (36.8  C) (Tympanic) 16 5' 6.75\" (1.695 m) 05/15/2018 (Approximate) 24.3 kg/m2       Blood Pressure from Last 3 Encounters:   18 120/80   18 132/84   18 140/72    Weight from Last 3 Encounters:   18 154 lb (69.9 kg)   18 157 lb 3.2 oz (71.3 kg)   18 154 lb (69.9 kg)              We Performed the Following     Anti Nuclear Svetlana IgG by IFA with Reflex     CBC with platelets     CRP, inflammation     ESR: Erythrocyte sedimentation rate     PAIN MANAGEMENT REFERRAL     Vitamin B12          Today's Medication Changes          These changes are accurate as of 18 12:45 PM.  If you have any questions, ask your nurse or doctor.               These medicines have changed " or have updated prescriptions.        Dose/Directions    sertraline 50 MG tablet   Commonly known as:  ZOLOFT   This may have changed:  how much to take   Used for:  Adjustment disorder with depressed mood   Changed by:  Shiva Hand MD        Dose:  100 mg   Take 2 tablets (100 mg) by mouth daily   Quantity:  180 tablet   Refills:  1       topiramate 25 MG tablet   Commonly known as:  TOPAMAX   This may have changed:    - how much to take  - when to take this  - additional instructions   Used for:  Pain in both hands, Paresthesias        Take 1 tablet (25 mg) at bedtime for 1 week, then 1 tablet twice daily for 1 week, then 1 tablet in AM and 2 in PM for 1 week, then 2 tablets twice daily.   Quantity:  120 tablet   Refills:  3            Where to get your medicines      These medications were sent to [facility] [address] [place] [address] AT [facility] [address] [address]     Phone:  [phone]     sertraline 50 MG tablet         Some of these will need a paper prescription and others can be bought over the counter.  Ask your nurse if you have questions.     Bring a paper prescription for each of these medications     HYDROcodone-acetaminophen 5-325 MG per tablet               Information about OPIOIDS     PRESCRIPTION OPIOIDS: WHAT YOU NEED TO KNOW   You have a prescription for an opioid (narcotic) pain medicine. Opioids can cause addiction. If you have a history of chemical dependency of any type, you are at a higher risk of becoming addicted to opioids. Only take this medicine after all other options have been tried. Take it for as short a time and as few doses as possible.     Do not:    Drive. If you drive while taking these medicines, you could be arrested for driving under the influence (DUI).    Operate heavy machinery    Do any other dangerous activities while taking these medicines.     Drink any alcohol while taking these medicines.       Take with any other medicines that contain acetaminophen. Read all labels carefully. Look for the word  acetaminophen  or  Tylenol.  Ask your pharmacist if you have questions or are unsure.    Store your pills in a secure place, locked if possible. We will not replace any lost or stolen medicine. If you don t finish your medicine, please throw away (dispose) as directed by your pharmacist. The [place] Pollution Control Agency has more information about safe disposal: https://www.pca.[facility].mn.us/living-green/managing-unwanted-medications    All opioids tend to cause constipation. Drink plenty of water and eat foods that have a lot of fiber, such as fruits, vegetables, prune juice, apple juice and high-fiber cereal. Take a laxative (Miralax, milk of magnesia, Colace, Senna) if you don t move your bowels at least every other day.          Primary Care Provider Office Phone # Fax #    Young Hoskins [phone] [address] [place] [id number]        Equal Access to Services     MARLA SIMON AH: Hadii aad ku hadasho Soomaali, waaxda luqadaha, qaybta kaalmada adeegyada, waxay idiin hayradhan paco aragon . So [facility] [phone].    ATENCIÓN: Si habla español, tiene a mckenzie disposición servicios gratuitos de asistencia lingüística. [facility] [phone].    We comply with applicable federal civil rights laws and [place] laws. We do not discriminate on the basis of race, color, national origin, age, disability, sex, sexual orientation, or gender identity.            Thank you!     Thank you for choosing [facility]  for your care. Our goal is always to provide you with excellent care. Hearing back from our patients is one way we can continue to improve our services. Please take a few minutes to complete the written survey that you may receive in the mail after your visit with us. Thank you!             Your Updated Medication List - Protect others around you:  Learn how to safely use, store and throw away your medicines at www.disposemymeds.org.          This list is accurate as of 6/4/18 12:45 PM.  Always use your most recent med list.                   Brand Name Dispense Instructions for use Diagnosis    diclofenac 1 % Gel topical gel    VOLTAREN    100 g    Apply 2 grams to hands and forearms four times daily PRN using enclosed dosing card.    Bilateral hand pain, Pain in both forearms       gabapentin 300 MG capsule    NEURONTIN    540 capsule    TAKE 2 CAPSULES(600 MG) BY MOUTH THREE TIMES DAILY    Neuropathy       * HYDROcodone-acetaminophen 5-325 MG per tablet    NORCO    4 tablet    Take 1 tablet by mouth every 4 hours as needed for pain    Other chronic pain       * HYDROcodone-acetaminophen 5-325 MG per tablet    NORCO    30 tablet    1 tab bid prn pain    Pain in finger of both hands       sertraline 50 MG tablet    ZOLOFT    180 tablet    Take 2 tablets (100 mg) by mouth daily    Adjustment disorder with depressed mood       SUMAtriptan 100 MG tablet    IMITREX    12 tablet    Take 1 tablet (100 mg) by mouth at onset of headache for migraine May repeat in 2 hours. Max 2 tablets/24 hours.    Migraine without status migrainosus, not intractable, unspecified migraine type       topiramate 25 MG tablet    TOPAMAX    120 tablet    Take 1 tablet (25 mg) at bedtime for 1 week, then 1 tablet twice daily for 1 week, then 1 tablet in AM and 2 in PM for 1 week, then 2 tablets twice daily.    Pain in both hands, Paresthesias       * Notice:  This list has 2 medication(s) that are the same as other medications prescribed for you. Read the directions carefully, and ask your doctor or other care provider to review them with you.

## 2018-06-04 NOTE — PROGRESS NOTES
"  SUBJECTIVE:   Ariadne Lowry is a 43 year old female who presents to clinic today for the following health issues:  Chief Complaint   Patient presents with     Musculoskeletal Problem     She has been to seen by Dr. Lyons. Her  doesn't feel as if the testing being done is getting to ther root of her problem.       Bilateral Hands    Duration: 2 yrs    Description (location/character/radiation): Bilateral hand pain and arms    Intensity:  4/10    Worse can be up to 10/10    Accompanying signs and symptoms: Gripping issues when she gets a flare up.    History (similar episodes/previous evaluation): yes  Will be having EMG for number two. MRI done 02/2018.    Precipitating or alleviating factors: ???    Therapies tried and outcome: Gabapentin at 300 mg 2 caps tid with Topamax current dose is 50 mg but told she can increase to 75 mg today. Hydrocodone 5-325 is helpful.      Seeing Dr. Hoskins for hand and arm pain.   She has had two MRI scans on neck.    She has had EMG.  She has another EMG on 6/14.   Has been on gabapentin.   Was getting hydrocodone/acetaminophen 5/325mg for breakthrough pain.  Taking 1-2 pills a day.   Wants another prescription for the narcotics.   Also on Topamax-increasing doses.   25mg, 50mg, then 75mg then up to 100mg.  Will stop gabapentin when on 100mg Topamax.    Onset of symptoms: 2 years.  Course of symptoms over time: worse over time.  Thought it was \"overuse\".    Did see physical therapy, chiropractor, saw hand therapy (Dr. Mcintosh) who ordered first EMG.     She had carpal tunnel injections which did not help.  Tried gabapentin.  Seemed to help initially until last fall.  Did see Dr. Hopson, spine surgeon.  Was told not a surgical problem.   Saw Dr. Gupta, ortho spine surgeon in [place] for second opinion.     Location: pain seems to start in wrists and radiates to dorsal hands.  All fingers get numb bilateral.   Anterior forearms.   No pain above elbow.    Aggravating factors: " "gripping, driving, scrubbing.     Occupation: . Computer work.   Spouse has been doing driving and housework.     Patient Active Problem List   Diagnosis     Cervical radicular pain     Migraine without status migrainosus, not intractable, unspecified migraine type     Bilateral carpal tunnel syndrome     Adjustment disorder with depressed mood      Healthy in the past.  occasional UTI.   Has had history of migraine headaches.   3-4 per month  sumatriptan helps.   Has had depression.  This has only been recently with pain.   Alcohol  None  No drugs.  No drug problems in the past.     occasional soreness shoulders and back.   No neck pain-rare.    Shoulders OK.     ROS:General: No change in weight, sleep or appetite.  Normal energy.  No fever or chills  Resp: No coughing, wheezing or shortness of breath  CV: No chest pains or palpitations  GI: No nausea, vomiting,  heartburn, abdominal pain, diarrhea, constipation or change in bowel habits  Endocrine: No history of thyroid disease, diabetes or other endocrine disorders    OBJECTIVE:Blood pressure 120/80, pulse 80, temperature 98.2  F (36.8  C), temperature source Tympanic, resp. rate 16, height 5' 6.75\" (1.695 m), weight 154 lb (69.9 kg), last menstrual period 05/15/2018, not currently breastfeeding. BMI=Body mass index is 24.3 kg/(m^2).  GENERAL APPEARANCE ADULT: Alert, no acute distress  NECK: No adenopathy,masses or thyromegaly  CV: normal rate, regular rhythm, no murmur or gallop  ABDOMEN: soft, no organomegaly, masses or tenderness  MS: extremities normal, no peripheral edema  She has normal appearing forearms, wrists and hands.  Thee is bruising on left forearm from fall last week.    She has full range of motion neck, shoulders, elbows wrists and hands.  There is no joint erythema or swelling in wrists or hands.   No tenderness to palpation.   NEURO: Alert, oriented, speech and mentation normal, Cranial nerves 2-12 are normal., Strength normal and " symmetrical in upper extremities., Reflexes 2+ and symmetrical at biceps, triceps, brachioradialis.  Normal monofilament sensation in both hands.     ASSESSMENT:     ICD-10-CM    1. Pain in finger of both hands M79.645 PAIN MANAGEMENT REFERRAL    M79.644 HYDROcodone-acetaminophen (NORCO) 5-325 MG per tablet   2. Adjustment disorder with depressed mood F43.21 sertraline (ZOLOFT) 50 MG tablet      Bilateral upper extremity pain without apparent cause.  Multiple tests and consults have not so far been revealing.  Treatments so far have not been of benefit.  Does not seem like inflammatory process.  Not typical for fibromyalgia.  Not fitting any specific nerve distribution.  EMG earlier has not supported carpal tunnel diagnosis although symptoms could fit with this.     I have refilled the hydrocodone/acetaminophen 5/325mg to take sparingly.  I will not refill before a month.    Continue increasing the Topamax as planned.   EMG next week.  Schedule an appointment with pain clinic.   Check blood tests for inflammatory condition.   Increase your sertraline to 100mg (two of the 50mg pills) daily.   Recheck in a month.   Consider trial tricyclic if not improving on above.

## 2018-06-04 NOTE — LETTER
My Depression Action Plan  Name: Ariadne Lowry   Date of Birth 1975  Date: 6/4/2018    My doctor: Young Hoskins   My clinic: [address] [id number] [phone]          GREEN    ZONE   Good Control    What it looks like:     Things are going generally well. You have normal up s and down s. You may even feel depressed from time to time, but bad moods usually last less than a day.   What you need to do:  1. Continue to care for yourself (see self care plan)  2. Check your depression survival kit and update it as needed  3. Follow your physician s recommendations including any medication.  4. Do not stop taking medication unless you consult with your physician first.           YELLOW         ZONE Getting Worse    What it looks like:     Depression is starting to interfere with your life.     It may be hard to get out of bed; you may be starting to isolate yourself from others.    Symptoms of depression are starting to last most all day and this has happened for several days.     You may have suicidal thoughts but they are not constant.   What you need to do:     1. Call your care team, your response to treatment will improve if you keep your care team informed of your progress. Yellow periods are signs an adjustment may need to be made.     2. Continue your self-care, even if you have to fake it!    3. Talk to someone in your support network    4. Open up your depression survival kit           RED    ZONE Medical Alert - Get Help    What it looks like:     Depression is seriously interfering with your life.     You may experience these or other symptoms: You can t get out of bed most days, can t work or engage in other necessary activities, you have trouble taking care of basic hygiene, or basic responsibilities, thoughts of suicide or death that will not go away, self-injurious behavior.     What you need to do:  1. Call your care team and  request a same-day appointment. If they are not available (weekends or after hours) call your local crisis line, emergency room or 911.            Depression Self Care Plan / Survival Kit    Self-Care for Depression  Here s the deal. Your body and mind are really not as separate as most people think.  What you do and think affects how you feel and how you feel influences what you do and think. This means if you do things that people who feel good do, it will help you feel better.  Sometimes this is all it takes.  There is also a place for medication and therapy depending on how severe your depression is, so be sure to consult with your medical provider and/ or Behavioral Health Consultant if your symptoms are worsening or not improving.     In order to better manage my stress, I will:    Exercise  Get some form of exercise, every day. This will help reduce pain and release endorphins, the  feel good  chemicals in your brain. This is almost as good as taking antidepressants!  This is not the same as joining a gym and then never going! (they count on that by the way ) It can be as simple as just going for a walk or doing some gardening, anything that will get you moving.      Hygiene   Maintain good hygiene (Get out of bed in the morning, Make your bed, Brush your teeth, Take a shower, and Get dressed like you were going to work, even if you are unemployed).  If your clothes don't fit try to get ones that do.    Diet  I will strive to eat foods that are good for me, drink plenty of water, and avoid excessive sugar, caffeine, alcohol, and other mood-altering substances.  Some foods that are helpful in depression are: complex carbohydrates, B vitamins, flaxseed, fish or fish oil, fresh fruits and vegetables.    Psychotherapy  I agree to participate in Individual Therapy (if recommended).    Medication  If prescribed medications, I agree to take them.  Missing doses can result in serious side effects.  I understand that  drinking alcohol, or other illicit drug use, may cause potential side effects.  I will not stop my medication abruptly without first discussing it with my provider.    Staying Connected With Others  I will stay in touch with my friends, family members, and my primary care provider/team.    Use your imagination  Be creative.  We all have a creative side; it doesn t matter if it s oil painting, sand castles, or mud pies! This will also kick up the endorphins.    Witness Beauty  (AKA stop and smell the roses) Take a look outside, even in mid-winter. Notice colors, textures. Watch the squirrels and birds.     Service to others  Be of service to others.  There is always someone else in need.  By helping others we can  get out of ourselves  and remember the really important things.  This also provides opportunities for practicing all the other parts of the program.    Humor  Laugh and be silly!  Adjust your TV habits for less news and crime-drama and more comedy.    Control your stress  Try breathing deep, massage therapy, biofeedback, and meditation. Find time to relax each day.     My support system    Clinic Contact:  Phone number:    Contact 1:  Phone number:    Contact 2:  Phone number:    Anabaptist/:  Phone number:    Therapist:  Phone number:    Local crisis center:    Phone number:    Other community support:  Phone number:

## 2018-06-04 NOTE — LETTER
June 8, 2018      Ariadne Alen  [address] [address]        Dear ,    We are writing to inform you of your test results.    Your vitamin B12 test, blood counts, test for lupus and inflammatory conditions are all normal    If you have any questions or concerns, please call the clinic at the number listed above.       Sincerely,    Shiva Hand MD/ ss    Resulted Orders   ESR: Erythrocyte sedimentation rate   Result Value Ref Range    Sed Rate 9 0 - 20 mm/h   CRP, inflammation   Result Value Ref Range    CRP Inflammation 4.4 0.0 - 8.0 mg/L   CBC with platelets   Result Value Ref Range    WBC 8.4 4.0 - 11.0 10e9/L    RBC Count 4.50 3.8 - 5.2 10e12/L    Hemoglobin 14.4 11.7 - 15.7 g/dL    Hematocrit 42.0 35.0 - 47.0 %    MCV 93 78 - 100 fl    MCH 32.0 26.5 - 33.0 pg    MCHC 34.3 31.5 - 36.5 g/dL    RDW 13.3 10.0 - 15.0 %    Platelet Count 273 150 - 450 10e9/L   Vitamin B12   Result Value Ref Range    Vitamin B12 600 193 - 986 pg/mL   Anti Nuclear Svetlana IgG by IFA with Reflex   Result Value Ref Range    MIKE interpretation Negative NEG^Negative      Comment:                                         Reference range:  <1:40  NEGATIVE  1:40 - 1:80  BORDERLINE POSITIVE  >1:80 POSITIVE

## 2018-06-04 NOTE — NURSING NOTE
"Chief Complaint   Patient presents with     Musculoskeletal Problem       Initial /80  Pulse 80  Temp 98.2  F (36.8  C) (Tympanic)  Resp 16  Ht 5' 6.75\" (1.695 m)  Wt 154 lb (69.9 kg)  LMP 05/15/2018 (Approximate)  BMI 24.3 kg/m2 Estimated body mass index is 24.3 kg/(m^2) as calculated from the following:    Height as of this encounter: 5' 6.75\" (1.695 m).    Weight as of this encounter: 154 lb (69.9 kg).      Health Maintenance that is potentially due pending provider review:          Is there anyone who you would like to be able to receive your results? If yes have patient fill out LORI    "

## 2018-06-04 NOTE — TELEPHONE ENCOUNTER
I spoke with Fe this morning.  She has record that they did call the patient on 6/1/18 and left a message asking her to return the call.  Fe will call the patient this morning to scheduled the EMG.

## 2018-06-04 NOTE — PATIENT INSTRUCTIONS
ASSESSMENT:     ICD-10-CM    1. Pain in finger of both hands M79.645 PAIN MANAGEMENT REFERRAL    M79.644 HYDROcodone-acetaminophen (NORCO) 5-325 MG per tablet   2. Adjustment disorder with depressed mood F43.21 sertraline (ZOLOFT) 50 MG tablet      I have refilled the hydrocodone/acetaminophen 5/325mg to take sparingly.  I will not refill before a month.    Continue increasing the Topamax as planned.   EMG next week.  Schedule an appointment with pain clinic.   Check blood tests for inflammatory condition.   Increase your sertraline to 100mg (two of the 50mg pills) daily.   Recheck in a month.

## 2018-06-04 NOTE — TELEPHONE ENCOUNTER
Spoke with patient and advised of note below from provider. Pt reported that she was being seen in [place] today since her PCP is out until august and the [place] clinic has closed now. Pt reported that she had been to urgent care in the meantime to get pain meds for her break through headache pain. Discussed with patient how the pain clinic would cover this and pt declined at this time until she saw the provider in [place].  Pt then stated that she has left messages to schedule her EMG with Fe and has not gotten any call back. She would like clinic to call and have EMG call her today to get scheduled. Advised pt to call back if she would like the pain referral. Advised pt to continue increasing the Topamax as prescribed and to call if she had questions.  Umm GONZALEZ RN BSN PHN  Specialty Clinics

## 2018-06-05 LAB — ANA SER QL IF: NEGATIVE

## 2018-06-05 ASSESSMENT — ANXIETY QUESTIONNAIRES: GAD7 TOTAL SCORE: 1

## 2018-06-05 ASSESSMENT — PATIENT HEALTH QUESTIONNAIRE - PHQ9: SUM OF ALL RESPONSES TO PHQ QUESTIONS 1-9: 8

## 2018-06-06 ENCOUNTER — TELEPHONE (OUTPATIENT)
Dept: PALLIATIVE MEDICINE | Facility: CLINIC | Age: 43
End: 2018-06-06

## 2018-06-06 NOTE — TELEPHONE ENCOUNTER
Pain Management Center Referral      1. Confirmed address with patient? Yes  2. Confirmed phone number with patient? Yes  3. Confirmed referring provider? Yes  4. Is the PCP the same as the referring provider? No - PCP is out on vacation for 3 months  5. Has the patient been to any previous pain clinics? No  (If yes, send LORI with welcome letter)  6. Which insurance are we to bill for this appointment?  Offerial    7. Informed pt of cancellation (48 hour) policy? Yes    REGARDING OPIOID MEDICATIONS: We will always address appropriateness of opioid pain medications, but we generally will not automatically take on a prescribing role. When we do take on prescribing of opioids for chronic pain, it is in collaboration with the referring physician for an intermediate period of time (months), with an expectation that the primary physician or provider will assume the prescribing role if medications are effective at stable doses with demonstrated compliance. Therefore, please do not assume that your prescribing responsibilities end on the day of pain clinic consultation.  7. Informed pt of prescribing policy? Yes      8. Referring Provider: Dr. Yazan Mendoza    [facility]

## 2018-06-06 NOTE — LETTER
June 6, 2018    Ariadne Lowry  [address] [address]    Dear Ariadne                                                               Welcome to the [facility].  We are located on the 2nd floor ([address]) of the [facility], located at [address] [place], [place] [address], [place] [address].    Your appointment at the [facility] has been scheduled on Wednesday, AUGUST 15TH at 3:00PM with Di Barron NP.    At your first visit, you will meet your team of caregivers who will help you to develop pain management strategies that will last a lifetime. You will meet with our support staff to review your insurance information, and collect your co-payment if required by your insurance company. You will also meet with a medical pain specialist and care coordinator who will assess your pain and develop a plan of care for your successful pain rehabilitation. You should expect to spend 1-2 hours at your first visit with us. Usually, patients work with us for a period of 6-12 months, and eventually return to their primary doctor once their pain management has stabilized.      To help us make your visit go as smoothly as possible, please bring the following items with you on your visit:     Completed Pain Questionnaire enclosed in this packet.  If you do not bring the completed questionnaire, we may have to reschedule your appointment.  List of any medicines that you are currently taking or have been prescribed  Important NON-[place] medical information such as medical records or tests results (X-rays, or laboratory tests)  Your health insurance card  Financial resources to cover your co-payment or balance due at the time of service (cash, personal check, Visa, and MasterCard are acceptable methods of payment)     Due to the demand for new patient evaluations, you must notify the scheduling department 48 hours in advance if you are not able to keep this appointment. Failure to do  so could affect your ability to reschedule with our clinic. Please be aware that we will not prescribe any medications at your first visit.     Please call [phone] with any questions regarding your appointment. We look forward to meeting you and working to address your health care needs.     Sincerely,      [facility]

## 2018-06-13 ENCOUNTER — TRANSFERRED RECORDS (OUTPATIENT)
Dept: HEALTH INFORMATION MANAGEMENT | Facility: CLINIC | Age: 43
End: 2018-06-13

## 2018-06-15 ENCOUNTER — TELEPHONE (OUTPATIENT)
Dept: NEUROLOGY | Facility: CLINIC | Age: 43
End: 2018-06-15

## 2018-06-15 NOTE — TELEPHONE ENCOUNTER
Pt called stating that she had an EEG on 06/13/18 and would like to know what the results are. Pt states that she is unable to get an appointment with Dr. Lyons for 2 months and would like to know the results prior to that. Please advise.    Brandie Watts  [facility]

## 2018-06-15 NOTE — TELEPHONE ENCOUNTER
"(Patient called regarding EMG)  Called patient back to inform patient that Dr. Lyons is out of the office today. Any earlier appointment would have to be approved by provider. Offered to schedule appointment, in next available slot, and placed on wait list for sooner opening, patient declined.   Very upset during call (anxious, sounded like she was crying intermittently), stating frequently \"no one wants to help me or tell me what's wrong.. They just want me to manage my pain.. It is hard dropping the hair brush, not being able to brush your teeth and not being able to drive.. I get one test after an other but no one tells me what's wrong..\" Listened to patient's concerns, validated her frustration.   Explained to patient process for getting results. Discussed that results are not currently available and provider out of office until Monday.   Patient stated \"I know where the results are, they are across the lenz, you can go get them if you want\"  Patient also discussed at length her frustration with how different this test was from her previous test- unable to state specifics, other than:more painful, more involved and in 2 parts.   Again validated patient's frustrations, explained that when results are available, patient will be called.   If provider would like to have patient seen sooner than next available opening will let patient know at that time as well.     Will route as FYI to provider and [facility].   Kelli MA RN   Specialty Clinics     "

## 2018-06-18 ENCOUNTER — TELEPHONE (OUTPATIENT)
Dept: NEUROLOGY | Facility: CLINIC | Age: 43
End: 2018-06-18

## 2018-06-18 NOTE — TELEPHONE ENCOUNTER
Called AAKASH garcia/ Dr. Srinivasan's care team per 's request:     As far as I know, we do not yet have the electromyogram results. I usually get them from Dr. Srinivasan within about one week. Can we reach out to his office to request this one be expedited?     Thanks,   FLOR (Routing comment    Kelli MA RN   Specialty Clinics

## 2018-06-18 NOTE — TELEPHONE ENCOUNTER
Spoke to pt and reviewed results. Pt verbalized that she did not understand what was wrong with her.  Pt stated she did not think the Topamax was helping and she has new pain that is tingling pins and needles in her hands and feet. Please review. Appt was made for pt on Wednesday.  Umm GONZALEZ RN BSN PHN  Specialty Clinics

## 2018-06-18 NOTE — TELEPHONE ENCOUNTER
Please let Ariadne know that her electromyogram showed signs of chronic Right-sided radiculopathy (which means the nerve root was pinched at some point, but is not actively being irritated). This is consistent with the degenerative changes in her cervical spine. No evidence of carpal tunnel syndrome or other peripheral nerve impingement to explain her symptoms. I am happy to see that she has scheduled with Di Barron in Pain clinic. I think this will be very helpful for her.     Thank you,  Freida Lyons MD

## 2018-06-20 ENCOUNTER — OFFICE VISIT (OUTPATIENT)
Dept: NEUROLOGY | Facility: CLINIC | Age: 43
End: 2018-06-20
Payer: COMMERCIAL

## 2018-06-20 VITALS
TEMPERATURE: 98.8 F | BODY MASS INDEX: 23.07 KG/M2 | SYSTOLIC BLOOD PRESSURE: 113 MMHG | DIASTOLIC BLOOD PRESSURE: 69 MMHG | WEIGHT: 147 LBS | HEIGHT: 67 IN | HEART RATE: 86 BPM

## 2018-06-20 DIAGNOSIS — M79.641 PAIN IN BOTH HANDS: Primary | ICD-10-CM

## 2018-06-20 DIAGNOSIS — M79.642 PAIN IN BOTH HANDS: Primary | ICD-10-CM

## 2018-06-20 PROCEDURE — 99215 OFFICE O/P EST HI 40 MIN: CPT | Performed by: PSYCHIATRY & NEUROLOGY

## 2018-06-20 NOTE — PROGRESS NOTES
ESTABLISHED PATIENT NEUROLOGY NOTE    DATE OF VISIT: 6/20/2018  MRN: [id number]  PATIENT NAME: Ariadne Lowry  YOB: 1975    Chief Complaint   Patient presents with     RECHECK     Pain, DDD (Cervical)      Results     EMG      SUBJECTIVE:                                                      HISTORY OF PRESENT ILLNESS:  Ariadne is here for follow up regarding pain in her hands. I met the patient in consultation for the same about one month ago. She had already undergone a rather extensive work-up, and orthopedics consultation for the pain. She has mild cervical degenerative changes. Electromyogram had reportedly been normal though I did not have the report at the time of her visit. There was some discussion of fibromyalgia as the cause for her symptoms. She had tried gabapentin and Topamax, and Lyrica had been considered by her previous neurologist but the latter proved to be too expensive. She had also tried injections and narcotics. We had decided to restart the Topamax and then possibly taper off of the gabapentin (she reported sexual side effects). We repeated the nerve conduction studies/EMG and this showed a chronic Rt C7 radiculopathy only. No peripheral nerve compression or diffuse neuropathy. My plan was to have the patient seek symptomatic management through the pain clinic if the electromyogram was unremarkable. She comes in today to discuss the results further.     The patient tells me today that she has stopped the gabapentin and started to taper the Topamax as I had advised over the phone (she reported increased pain/paresthesias).     She clarifies that the Topamax is causing some some pain in the feet as well. She does endorse some tingling but really just more pain similar to what she has been experiencing in the hands. She is down to 50mg daily of the Topamax. Otherwise, her symptoms are the same.      The patient cries throughout the visit today.     The patient perseverates about not  being able to get the hydrocodone at the end of the visit. She is upset because she does not have answers and her primary care provider is on vacation. She did get a new prescription for the hydrocodone from Dr. Hand on 6.4.18.      She is scheduled to be seen in pain clinic on 8.15.18.    Please see my initial consultation note dated 5.22.18 for additional historical information.     CURRENT MEDICATIONS:     Current Outpatient Prescriptions on File Prior to Visit:  diclofenac (VOLTAREN) 1 % GEL topical gel Apply 2 grams to hands and forearms four times daily PRN using enclosed dosing card.   HYDROcodone-acetaminophen (NORCO) 5-325 MG per tablet 1 tab bid prn pain   HYDROcodone-acetaminophen (NORCO) 5-325 MG per tablet Take 1 tablet by mouth every 4 hours as needed for pain   sertraline (ZOLOFT) 50 MG tablet Take 2 tablets (100 mg) by mouth daily   SUMAtriptan (IMITREX) 100 MG tablet Take 1 tablet (100 mg) by mouth at onset of headache for migraine May repeat in 2 hours. Max 2 tablets/24 hours.   topiramate (TOPAMAX) 25 MG tablet Take 1 tablet (25 mg) at bedtime for 1 week, then 1 tablet twice daily for 1 week, then 1 tablet in AM and 2 in PM for 1 week, then 2 tablets twice daily. (Patient taking differently: 50 mg daily Take 1 tablet (25 mg) at bedtime for 1 week, then 1 tablet twice daily for 1 week, then 1 tablet in AM and 2 in PM for 1 week, then 2 tablets twice daily.)     No current facility-administered medications on file prior to visit.     RECENT DIAGNOSTIC STUDIES:   Labs:   Results for orders placed or performed in visit on 06/04/18   ESR: Erythrocyte sedimentation rate   Result Value Ref Range    Sed Rate 9 0 - 20 mm/h   CRP, inflammation   Result Value Ref Range    CRP Inflammation 4.4 0.0 - 8.0 mg/L   CBC with platelets   Result Value Ref Range    WBC 8.4 4.0 - 11.0 10e9/L    RBC Count 4.50 3.8 - 5.2 10e12/L    Hemoglobin 14.4 11.7 - 15.7 g/dL    Hematocrit 42.0 35.0 - 47.0 %    MCV 93 78 - 100 fl     "MCH 32.0 26.5 - 33.0 pg    MCHC 34.3 31.5 - 36.5 g/dL    RDW 13.3 10.0 - 15.0 %    Platelet Count 273 150 - 450 10e9/L   Vitamin B12   Result Value Ref Range    Vitamin B12 600 193 - 986 pg/mL   Anti Nuclear Svetlana IgG by IFA with Reflex   Result Value Ref Range    MIKE interpretation Negative NEG^Negative       REVIEW OF SYSTEMS:                                                      10-point review of systems is negative except as mentioned above in HPI.     EXAM:                                                      Physical Exam:   Vitals: /69 (BP Location: Right arm, Patient Position: Chair, Cuff Size: Adult Regular)  Pulse 86  Temp 98.8  F (37.1  C) (Tympanic)  Ht 1.695 m (5' 6.75\")  Wt 66.7 kg (147 lb)  BMI 23.2 kg/m2  BMI= Body mass index is 23.2 kg/(m^2).  GENERAL: Tearful, crying. Screaming at times.   Neurologic:  MENTAL STATUS: Alert, attentive. Speech is fluent. Normal comprehension. Adequate fund of knowledge. Perseverative.  CRANIAL NERVES: Discs flat. Visual fields intact to confrontation. Pupils equally, round and reactive to light. Facial sensation and movement normal. EOM full. Hearing intact with finger rub. Sternocleidomastoids and trapezius strength intact. Palate moves symmetrically. Tongue midline.  MOTOR: 5/5 in proximal and distal muscle groups of upper and lower extremities. Tone and bulk normal.   DTRs: Intact and symmetric.  Ankle jerks present. Babinski down-going bilaterally.   SENSATION: Normal light touch and pinprick. Intact proprioception. Vibration: Normal at both ankles.   COORDINATION: Normal finger nose finger. Finger tapping normal. Knee heel shin normal.  STATION AND GAIT: Romberg negative. Tandem normal.  CV: RRR.  S1, S2.   NECK: No bruits.  No tender points of fibromyalgia.     ASSESSMENT and PLAN:                                                      Assessment and Plan:    ICD-10-CM    1. Pain in both hands M79.641     M79.642         Ms. Lowry is a 42 yo woman with 2 " year history of bilateral wrist, forearm and hand pain. Neurologic exam and evaluation have not revealed a cause. I had a long discussion with Ariadne about pain management and how this can be helpful even if we cannot find an organic etiology. I encouraged her to keep her pain clinic appointment. She is in need of a well-rounded pain plan. I am concerned that anxiety is playing a role in her symptoms.     That being said, from a neurology standpoint, I offered to send her to the [facility] for another opinion from neuropathy clinic. I also offered to do additional labs for small fiber neuropathy, though the presentation would be atypical for this. She did not want to do the testing at this time. I encouraged her to seek other potential specialty evaluations as deemed appropriate be her primary care provider.     Patient Instructions:  Stop the Topamax today.  Please follow-up with your pain clinic appointment.   Let me know if you change your mind about going to the [facility].   Otherwise, I recommend you return to primary clinic for additional evaluation.     Total Time: 40 minutes were spent with the patient. More than 50% of the time spent on counseling (as described above) /coordinating the care.    Freida Lyons MD  Neurology

## 2018-06-20 NOTE — LETTER
6/20/2018         RE: Ariadne Lowry  [address] [facility] [address]        Dear Colleague,    Thank you for referring your patient, Ariadne Lowry, to the [facility]. Please see a copy of my visit note below.    ESTABLISHED PATIENT NEUROLOGY NOTE    DATE OF VISIT: 6/20/2018  MRN: [id number]  PATIENT NAME: Ariadne Lowry  YOB: 1975    Chief Complaint   Patient presents with     RECHECK     Pain, DDD (Cervical)      Results     EMG      SUBJECTIVE:                                                      HISTORY OF PRESENT ILLNESS:  Ariadne is here for follow up regarding pain in her hands. I met the patient in consultation for the same about one month ago. She had already undergone a rather extensive work-up, and orthopedics consultation for the pain. She has mild cervical degenerative changes. Electromyogram had reportedly been normal though I did not have the report at the time of her visit. There was some discussion of fibromyalgia as the cause for her symptoms. She had tried gabapentin and Topamax, and Lyrica had been considered by her previous neurologist but the latter proved to be too expensive. She had also tried injections and narcotics. We had decided to restart the Topamax and then possibly taper off of the gabapentin (she reported sexual side effects). We repeated the nerve conduction studies/EMG and this showed a chronic Rt C7 radiculopathy only. No peripheral nerve compression or diffuse neuropathy. My plan was to have the patient seek symptomatic management through the pain clinic if the electromyogram was unremarkable. She comes in today to discuss the results further.     The patient tells me today that she has stopped the gabapentin and started to taper the Topamax as I had advised over the phone (she reported increased pain/paresthesias).     She clarifies that the Topamax is causing some some pain in the feet as well. She does endorse some tingling but really just more  pain similar to what she has been experiencing in the hands. She is down to 50mg daily of the Topamax. Otherwise, her symptoms are the same.      The patient cries throughout the visit today.     The patient perseverates about not being able to get the hydrocodone at the end of the visit. She is upset because she does not have answers and her primary care provider is on vacation. She did get a new prescription for the hydrocodone from Dr. Hand on 6.4.18.      She is scheduled to be seen in pain clinic on 8.15.18.    Please see my initial consultation note dated 5.22.18 for additional historical information.     CURRENT MEDICATIONS:     Current Outpatient Prescriptions on File Prior to Visit:  diclofenac (VOLTAREN) 1 % GEL topical gel Apply 2 grams to hands and forearms four times daily PRN using enclosed dosing card.   HYDROcodone-acetaminophen (NORCO) 5-325 MG per tablet 1 tab bid prn pain   HYDROcodone-acetaminophen (NORCO) 5-325 MG per tablet Take 1 tablet by mouth every 4 hours as needed for pain   sertraline (ZOLOFT) 50 MG tablet Take 2 tablets (100 mg) by mouth daily   SUMAtriptan (IMITREX) 100 MG tablet Take 1 tablet (100 mg) by mouth at onset of headache for migraine May repeat in 2 hours. Max 2 tablets/24 hours.   topiramate (TOPAMAX) 25 MG tablet Take 1 tablet (25 mg) at bedtime for 1 week, then 1 tablet twice daily for 1 week, then 1 tablet in AM and 2 in PM for 1 week, then 2 tablets twice daily. (Patient taking differently: 50 mg daily Take 1 tablet (25 mg) at bedtime for 1 week, then 1 tablet twice daily for 1 week, then 1 tablet in AM and 2 in PM for 1 week, then 2 tablets twice daily.)     No current facility-administered medications on file prior to visit.     RECENT DIAGNOSTIC STUDIES:   Labs:   Results for orders placed or performed in visit on 06/04/18   ESR: Erythrocyte sedimentation rate   Result Value Ref Range    Sed Rate 9 0 - 20 mm/h   CRP, inflammation   Result Value Ref Range    CRP  "Inflammation 4.4 0.0 - 8.0 mg/L   CBC with platelets   Result Value Ref Range    WBC 8.4 4.0 - 11.0 10e9/L    RBC Count 4.50 3.8 - 5.2 10e12/L    Hemoglobin 14.4 11.7 - 15.7 g/dL    Hematocrit 42.0 35.0 - 47.0 %    MCV 93 78 - 100 fl    MCH 32.0 26.5 - 33.0 pg    MCHC 34.3 31.5 - 36.5 g/dL    RDW 13.3 10.0 - 15.0 %    Platelet Count 273 150 - 450 10e9/L   Vitamin B12   Result Value Ref Range    Vitamin B12 600 193 - 986 pg/mL   Anti Nuclear Svetlana IgG by IFA with Reflex   Result Value Ref Range    MIKE interpretation Negative NEG^Negative       REVIEW OF SYSTEMS:                                                      10-point review of systems is negative except as mentioned above in HPI.     EXAM:                                                      Physical Exam:   Vitals: /69 (BP Location: Right arm, Patient Position: Chair, Cuff Size: Adult Regular)  Pulse 86  Temp 98.8  F (37.1  C) (Tympanic)  Ht 1.695 m (5' 6.75\")  Wt 66.7 kg (147 lb)  BMI 23.2 kg/m2  BMI= Body mass index is 23.2 kg/(m^2).  GENERAL: Tearful, crying. Screaming at times.   Neurologic:  MENTAL STATUS: Alert, attentive. Speech is fluent. Normal comprehension. Adequate fund of knowledge. Perseverative.  CRANIAL NERVES: Discs flat. Visual fields intact to confrontation. Pupils equally, round and reactive to light. Facial sensation and movement normal. EOM full. Hearing intact with finger rub. Sternocleidomastoids and trapezius strength intact. Palate moves symmetrically. Tongue midline.  MOTOR: 5/5 in proximal and distal muscle groups of upper and lower extremities. Tone and bulk normal.   DTRs: Intact and symmetric.  Ankle jerks present. Babinski down-going bilaterally.   SENSATION: Normal light touch and pinprick. Intact proprioception. Vibration: Normal at both ankles.   COORDINATION: Normal finger nose finger. Finger tapping normal. Knee heel shin normal.  STATION AND GAIT: Romberg negative. Tandem normal.  CV: RRR.  S1, S2.   NECK: No " bruits.  No tender points of fibromyalgia.     ASSESSMENT and PLAN:                                                      Assessment and Plan:    ICD-10-CM    1. Pain in both hands M79.641     M79.642         Ms. Lowry is a 44 yo woman with 2 year history of bilateral wrist, forearm and hand pain. Neurologic exam and evaluation have not revealed a cause. I had a long discussion with Ariadne about pain management and how this can be helpful even if we cannot find an organic etiology. I encouraged her to keep her pain clinic appointment. She is in need of a well-rounded pain plan. I am concerned that anxiety is playing a role in her symptoms.     That being said, from a neurology standpoint, I offered to send her to the [facility] for another opinion from neuropathy clinic. I also offered to do additional labs for small fiber neuropathy, though the presentation would be atypical for this. She did not want to do the testing at this time. I encouraged her to seek other potential specialty evaluations as deemed appropriate be her primary care provider.     Patient Instructions:  Stop the Topamax today.  Please follow-up with your pain clinic appointment.   Let me know if you change your mind about going to the [facility].   Otherwise, I recommend you return to primary clinic for additional evaluation.     Total Time: 40 minutes were spent with the patient. More than 50% of the time spent on counseling (as described above) /coordinating the care.    Freida Lyons MD  Neurology                    Again, thank you for allowing me to participate in the care of your patient.        Sincerely,        Freida Lyons MD

## 2018-06-20 NOTE — NURSING NOTE
"Chief Complaint   Patient presents with     RECHECK     Pain, DDD (Cervical)      Results     EMG        Initial /69 (BP Location: Right arm, Patient Position: Chair, Cuff Size: Adult Regular)  Pulse 86  Temp 98.8  F (37.1  C) (Tympanic)  Ht 1.695 m (5' 6.75\")  Wt 66.7 kg (147 lb)  BMI 23.2 kg/m2 Estimated body mass index is 23.2 kg/(m^2) as calculated from the following:    Height as of this encounter: 1.695 m (5' 6.75\").    Weight as of this encounter: 66.7 kg (147 lb).  BP completed using cuff size: regular  Medications and allergies reviewed.      Priyanka RODRIGUEZ CMA     "

## 2018-06-20 NOTE — MR AVS SNAPSHOT
"              After Visit Summary   6/20/2018    Ariadne Lowry    MRN: [id number]           Patient Information     Date Of Birth          1975        Visit Information        Provider Department      6/20/2018 1:30 PM Freida Lyons MD [facility]        Care Instructions    Plan:    Stop the Topamax today.  Please follow-up with your pain clinic appointment.   Let me know if you change your mind about going to the [facility].   Otherwise, I recommend you return to primary clinic for additional evaluation.             Follow-ups after your visit        Your next 10 appointments already scheduled     Aug 15, 2018  3:00 PM CDT   New Visit with KATHRYN Fletcher CNP   [facility] ([facility])    [address] [facility] [address] [phone]              Who to contact     If you have questions or need follow up information about today's clinic visit or your schedule please contact [facility] directly at [phone].  Normal or non-critical lab and imaging results will be communicated to you by Aryngahart, letter or phone within 4 business days after the clinic has received the results. If you do not hear from us within 7 days, please contact the clinic through Aryngahart or phone. If you have a critical or abnormal lab result, we will notify you by phone as soon as possible.  Submit refill requests through Txt4 or call your pharmacy and they will forward the refill request to us. Please allow 3 business days for your refill to be completed.          Additional Information About Your Visit        AryngaharAccipiter Systems Information     Txt4 lets you send messages to your doctor, view your test results, renew your prescriptions, schedule appointments and more. To sign up, go to [email or web address] . Click on \"Log in\" on the left side of the screen, which will take you to the Welcome page. Then click on \"Sign up Now\" on " "the right side of the page.     You will be asked to enter the access code listed below, as well as some personal information. Please follow the directions to create your username and password.     Your access code is: [email or web address]  Expires: 2018  4:30 PM     Your access code will  in 90 days. If you need help or a new code, please call your [place] clinic or [phone].        Care EveryWhere ID     This is your Care EveryWhere ID. This could be used by other organizations to access your [place] medical records  [id number]        Your Vitals Were     Pulse Temperature Height BMI (Body Mass Index)          86 98.8  F (37.1  C) (Tympanic) 1.695 m (5' 6.75\") 23.2 kg/m2         Blood Pressure from Last 3 Encounters:   18 113/69   18 120/80   18 132/84    Weight from Last 3 Encounters:   18 66.7 kg (147 lb)   18 69.9 kg (154 lb)   18 71.3 kg (157 lb 3.2 oz)              Today, you had the following     No orders found for display         Today's Medication Changes          These changes are accurate as of 18  2:07 PM.  If you have any questions, ask your nurse or doctor.               These medicines have changed or have updated prescriptions.        Dose/Directions    topiramate 25 MG tablet   Commonly known as:  TOPAMAX   This may have changed:    - how much to take  - when to take this  - additional instructions   Used for:  Pain in both hands, Paresthesias        Take 1 tablet (25 mg) at bedtime for 1 week, then 1 tablet twice daily for 1 week, then 1 tablet in AM and 2 in PM for 1 week, then 2 tablets twice daily.   Quantity:  120 tablet   Refills:  3                Primary Care Provider Office Phone # Fax #    [facility] [phone] [address] [place] [phone]        Equal Access to Services     MARLA SIMON AH: Reva Hercules, wateofiloda kelinqarias, qaybta theo anderson, karrie brar " la'eliza puckett. So [facility] [place] [phone].    ATENCIÓN: Si billla tamir, tiene a mckenzie disposición servicios gratuitos de asistencia lingüística. Rito salas [phone].    We comply with applicable federal civil rights laws and [place] laws. We do not discriminate on the basis of race, color, national origin, age, disability, sex, sexual orientation, or gender identity.            Thank you!     Thank you for choosing [facility]  for your care. Our goal is always to provide you with excellent care. Hearing back from our patients is one way we can continue to improve our services. Please take a few minutes to complete the written survey that you may receive in the mail after your visit with us. Thank you!             Your Updated Medication List - Protect others around you: Learn how to safely use, store and throw away your medicines at www.disposemymeds.org.          This list is accurate as of 6/20/18  2:07 PM.  Always use your most recent med list.                   Brand Name Dispense Instructions for use Diagnosis    diclofenac 1 % Gel topical gel    VOLTAREN    100 g    Apply 2 grams to hands and forearms four times daily PRN using enclosed dosing card.    Bilateral hand pain, Pain in both forearms       * HYDROcodone-acetaminophen 5-325 MG per tablet    NORCO    4 tablet    Take 1 tablet by mouth every 4 hours as needed for pain    Other chronic pain       * HYDROcodone-acetaminophen 5-325 MG per tablet    NORCO    30 tablet    1 tab bid prn pain    Pain in finger of both hands       sertraline 50 MG tablet    ZOLOFT    180 tablet    Take 2 tablets (100 mg) by mouth daily    Adjustment disorder with depressed mood       SUMAtriptan 100 MG tablet    IMITREX    12 tablet    Take 1 tablet (100 mg) by mouth at onset of headache for migraine May repeat in 2 hours. Max 2 tablets/24 hours.    Migraine without status migrainosus, not intractable, unspecified migraine type       topiramate 25 MG tablet    TOPAMAX     120 tablet    Take 1 tablet (25 mg) at bedtime for 1 week, then 1 tablet twice daily for 1 week, then 1 tablet in AM and 2 in PM for 1 week, then 2 tablets twice daily.    Pain in both hands, Paresthesias       * Notice:  This list has 2 medication(s) that are the same as other medications prescribed for you. Read the directions carefully, and ask your doctor or other care provider to review them with you.

## 2018-06-20 NOTE — PATIENT INSTRUCTIONS
Plan:    Stop the Topamax today.  Please follow-up with your pain clinic appointment.   Let me know if you change your mind about going to the [facility].   Otherwise, I recommend you return to primary clinic for additional evaluation.

## 2018-07-02 ENCOUNTER — OFFICE VISIT (OUTPATIENT)
Dept: FAMILY MEDICINE | Facility: CLINIC | Age: 43
End: 2018-07-02
Payer: COMMERCIAL

## 2018-07-02 VITALS
HEART RATE: 88 BPM | DIASTOLIC BLOOD PRESSURE: 62 MMHG | TEMPERATURE: 98.2 F | BODY MASS INDEX: 23.83 KG/M2 | WEIGHT: 151 LBS | RESPIRATION RATE: 17 BRPM | SYSTOLIC BLOOD PRESSURE: 110 MMHG

## 2018-07-02 DIAGNOSIS — G43.909 MIGRAINE WITHOUT STATUS MIGRAINOSUS, NOT INTRACTABLE, UNSPECIFIED MIGRAINE TYPE: ICD-10-CM

## 2018-07-02 DIAGNOSIS — M79.602 PAIN IN BOTH UPPER EXTREMITIES: Primary | ICD-10-CM

## 2018-07-02 DIAGNOSIS — M79.601 PAIN IN BOTH UPPER EXTREMITIES: Primary | ICD-10-CM

## 2018-07-02 PROCEDURE — 99213 OFFICE O/P EST LOW 20 MIN: CPT | Performed by: FAMILY MEDICINE

## 2018-07-02 RX ORDER — AMITRIPTYLINE HYDROCHLORIDE 10 MG/1
10 TABLET ORAL AT BEDTIME
Qty: 30 TABLET | Refills: 1 | Status: SHIPPED | OUTPATIENT
Start: 2018-07-02 | End: 2019-05-20

## 2018-07-02 ASSESSMENT — PAIN SCALES - GENERAL: PAINLEVEL: MILD PAIN (3)

## 2018-07-02 NOTE — PATIENT INSTRUCTIONS
ASSESSMENT:     ICD-10-CM    1. Pain in both upper extremities M79.601 amitriptyline (ELAVIL) 10 MG tablet    M79.602    2. Migraine without status migrainosus, not intractable, unspecified migraine type G43.909       OK to continue the chiropractor which has helped.  Continue the sertraline as you have been  Pain clinic appointment in August.   Trial amitriptyline 10mg at bedtime.  This can help for nerve related pain and can help with sleep.     REcheck in 2 months.  You can call in 3-4 weeks with progress.  If not better we can increase the dose.

## 2018-07-02 NOTE — PROGRESS NOTES
SUBJECTIVE:   Ariadne Lowry is a 43 year old female who presents to clinic today for the following health issues:  Last clinic visit: 6/20/2018  She saw neurology on 6/18.    Musculoskeletal problem/pain      Duration: on going x 2 years    Description  Location: bilateral hands and lower arms    Intensity:  moderate, severe, 3-10/10    Accompanying signs and symptoms: radiation of pain to fingers and to elbows, numbness and tingling    History  Previous similar problem: YES  Previous evaluation:  MRI, EMG, neuro and orthopedic- hand and spine evaluation    Precipitating or alleviating factors:  Trauma or overuse: no   Aggravating factors include: overuse and increase sx with gripping and states drops hair brush frequently, increase pain with driving.    Therapies tried and outcome: heat, ice, immobilization, stretching, support wrap, acetaminophen, Ibuprofen, chiropractor, physical therapy and hydrocodone    Saw chiropractor about 10 days ago in [place].  Had some type of adjustment.  Was recommended stretches.   Has been doing those stretches with bar in door frame and hanging.   Pain has been significantly less.  Seems to have better function.   Has used only 9 hydrocodone/acetaminophen 5/325mg  Now off the Topamax.  Dehydrated.  Tingling and numbness which was painful in hands and feet where she had not had symptoms before.  Some bowel problems.   Tried gabapentin which did not help.     She has had EMG.  Was painful.     Mood mostly better.  Currently on sertraline 100mg daily.      Migraines seem to have increased.  Three in the past month.    Has not been on preventative medications.  sumatriptan works within an hour.     Sleep not as good.  Tired when wakes up.     Occupation:     Patient Active Problem List   Diagnosis     Cervical radicular pain     Migraine without status migrainosus, not intractable, unspecified migraine type     Bilateral carpal tunnel syndrome     Adjustment disorder with  depressed mood      OBJECTIVE:Blood pressure 110/62, pulse 88, temperature 98.2  F (36.8  C), temperature source Tympanic, resp. rate 17, weight 151 lb (68.5 kg), last menstrual period 06/18/2018, not currently breastfeeding. BMI=Body mass index is 23.83 kg/(m^2).  GENERAL APPEARANCE ADULT: Alert, no acute distress  PSYCH: mentation appears normal., affect and mood normal     ASSESSMENT:     ICD-10-CM    1. Pain in both upper extremities M79.601 amitriptyline (ELAVIL) 10 MG tablet    M79.602    2. Migraine without status migrainosus, not intractable, unspecified migraine type G43.909       OK to continue the chiropractor which has helped.  Continue the sertraline as you have been  Pain clinic appointment in August.   Trial amitriptyline 10mg at bedtime.  This can help for nerve related pain and can help with sleep.     REcheck in 2 months.  You can call in 3-4 weeks with progress.  If not better we can increase the dose.

## 2018-07-02 NOTE — NURSING NOTE
"Chief Complaint   Patient presents with     Arm Pain     Bilateral lower arm and hand pain persisiting.       Initial /62 (BP Location: Right arm, Patient Position: Chair, Cuff Size: Adult Regular)  Pulse 88  Temp 98.2  F (36.8  C) (Tympanic)  Resp 17  Wt 151 lb (68.5 kg)  LMP 06/18/2018  Breastfeeding? No  BMI 23.83 kg/m2 Estimated body mass index is 23.83 kg/(m^2) as calculated from the following:    Height as of 6/20/18: 5' 6.75\" (1.695 m).    Weight as of this encounter: 151 lb (68.5 kg).      Health Maintenance that is potentially due pending provider review:  Pap Smear    Pt will schedule pap appt.    Is there anyone who you would like to be able to receive your results? Yes-   If yes have patient fill out LORI  Doreen Rock CMA    "

## 2018-07-02 NOTE — MR AVS SNAPSHOT
After Visit Summary   7/2/2018    Ariadne Lowry    MRN: [id number]           Patient Information     Date Of Birth          1975        Visit Information        Provider Department      7/2/2018 5:00 PM Shiva Hand MD [facility]        Today's Diagnoses     Pain in both upper extremities    -  1    Migraine without status migrainosus, not intractable, unspecified migraine type          Care Instructions    ASSESSMENT:     ICD-10-CM    1. Pain in both upper extremities M79.601 amitriptyline (ELAVIL) 10 MG tablet    M79.602    2. Migraine without status migrainosus, not intractable, unspecified migraine type G43.909       OK to continue the chiropractor which has helped.  Continue the sertraline as you have been  Pain clinic appointment in August.   Trial amitriptyline 10mg at bedtime.  This can help for nerve related pain and can help with sleep.     REcheck in 2 months.  You can call in 3-4 weeks with progress.  If not better we can increase the dose.           Follow-ups after your visit        Your next 10 appointments already scheduled     Aug 15, 2018  3:00 PM CDT   New Visit with KATHRYN Fletcher CNP   [facility] ([facility])    [address] [facility] [address] [phone]              Who to contact     If you have questions or need follow up information about today's clinic visit or your schedule please contact [facility] directly at [phone].  Normal or non-critical lab and imaging results will be communicated to you by MyChart, letter or phone within 4 business days after the clinic has received the results. If you do not hear from us within 7 days, please contact the clinic through MyChart or phone. If you have a critical or abnormal lab result, we will notify you by phone as soon as possible.  Submit refill requests through Sedicii or call your pharmacy and they will forward  the refill request to us. Please allow 3 business days for your refill to be completed.          Additional Information About Your Visit        Care EveryWhere ID     This is your Care EveryWhere ID. This could be used by other organizations to access your [place] medical records  [id number]        Your Vitals Were     Pulse Temperature Respirations Last Period Breastfeeding? BMI (Body Mass Index)    88 98.2  F (36.8  C) (Tympanic) 17 06/18/2018 No 23.83 kg/m2       Blood Pressure from Last 3 Encounters:   07/02/18 110/62   06/20/18 113/69   06/04/18 120/80    Weight from Last 3 Encounters:   07/02/18 151 lb (68.5 kg)   06/20/18 147 lb (66.7 kg)   06/04/18 154 lb (69.9 kg)              Today, you had the following     No orders found for display         Today's Medication Changes          These changes are accurate as of 7/2/18  6:44 PM.  If you have any questions, ask your nurse or doctor.               Start taking these medicines.        Dose/Directions    amitriptyline 10 MG tablet   Commonly known as:  ELAVIL   Used for:  Pain in both upper extremities   Started by:  Shiva Hand MD        Dose:  10 mg   Take 1 tablet (10 mg) by mouth At Bedtime   Quantity:  30 tablet   Refills:  1            Where to get your medicines      These medications were sent to [facility] [address] [place] [address] AT [facility] [address]  [address] [facility] [address]     Phone:  [phone]     amitriptyline 10 MG tablet                Primary Care Provider Office Phone # Fax #    [facility] [phone] [address] [place] [phone]        Equal Access to Services     [facility] AURORA AH: Hadii victorina Hercules, waaxda luqadaha, qaybta kaalmada adejoceyada, karrie puckett. So [facility] [phone].    ATENCIÓN: Si habla español, tiene a mckenzie disposición servicios gratuitos de asistencia lingüística. Llame al  [phone].    We comply with applicable federal civil rights laws and [place] laws. We do not discriminate on the basis of race, color, national origin, age, disability, sex, sexual orientation, or gender identity.            Thank you!     Thank you for choosing [facility]  for your care. Our goal is always to provide you with excellent care. Hearing back from our patients is one way we can continue to improve our services. Please take a few minutes to complete the written survey that you may receive in the mail after your visit with us. Thank you!             Your Updated Medication List - Protect others around you: Learn how to safely use, store and throw away your medicines at www.disposemymeds.org.          This list is accurate as of 7/2/18  6:44 PM.  Always use your most recent med list.                   Brand Name Dispense Instructions for use Diagnosis    amitriptyline 10 MG tablet    ELAVIL    30 tablet    Take 1 tablet (10 mg) by mouth At Bedtime    Pain in both upper extremities       diclofenac 1 % Gel topical gel    VOLTAREN    100 g    Apply 2 grams to hands and forearms four times daily PRN using enclosed dosing card.    Bilateral hand pain, Pain in both forearms       HYDROcodone-acetaminophen 5-325 MG per tablet    NORCO    30 tablet    1 tab bid prn pain    Pain in finger of both hands       sertraline 50 MG tablet    ZOLOFT    180 tablet    Take 2 tablets (100 mg) by mouth daily    Adjustment disorder with depressed mood       SUMAtriptan 100 MG tablet    IMITREX    12 tablet    Take 1 tablet (100 mg) by mouth at onset of headache for migraine May repeat in 2 hours. Max 2 tablets/24 hours.    Migraine without status migrainosus, not intractable, unspecified migraine type

## 2018-07-17 ENCOUNTER — OFFICE VISIT (OUTPATIENT)
Dept: URGENT CARE | Facility: URGENT CARE | Age: 43
End: 2018-07-17
Payer: COMMERCIAL

## 2018-07-17 ENCOUNTER — RADIANT APPOINTMENT (OUTPATIENT)
Dept: GENERAL RADIOLOGY | Facility: CLINIC | Age: 43
End: 2018-07-17
Attending: NURSE PRACTITIONER
Payer: COMMERCIAL

## 2018-07-17 VITALS
HEART RATE: 68 BPM | RESPIRATION RATE: 24 BRPM | TEMPERATURE: 98.5 F | DIASTOLIC BLOOD PRESSURE: 68 MMHG | BODY MASS INDEX: 24.14 KG/M2 | WEIGHT: 153 LBS | SYSTOLIC BLOOD PRESSURE: 126 MMHG

## 2018-07-17 DIAGNOSIS — S96.912A STRAIN OF LEFT ANKLE, INITIAL ENCOUNTER: Primary | ICD-10-CM

## 2018-07-17 DIAGNOSIS — S96.912A STRAIN OF LEFT ANKLE, INITIAL ENCOUNTER: ICD-10-CM

## 2018-07-17 PROCEDURE — 99214 OFFICE O/P EST MOD 30 MIN: CPT | Performed by: NURSE PRACTITIONER

## 2018-07-17 PROCEDURE — 73610 X-RAY EXAM OF ANKLE: CPT | Mod: LT

## 2018-07-17 RX ORDER — HYDROCODONE BITARTRATE AND ACETAMINOPHEN 5; 325 MG/1; MG/1
1 TABLET ORAL EVERY 6 HOURS PRN
Qty: 10 TABLET | Refills: 0 | Status: SHIPPED | OUTPATIENT
Start: 2018-07-17 | End: 2018-07-17

## 2018-07-17 ASSESSMENT — PAIN SCALES - GENERAL: PAINLEVEL: MILD PAIN (2)

## 2018-07-17 NOTE — MR AVS SNAPSHOT
After Visit Summary   7/17/2018    Ariadne Lowry    MRN: [id number]           Patient Information     Date Of Birth          1975        Visit Information        Provider Department      7/17/2018 7:50 PM Gladys Paula APRN CNP [facility]        Today's Diagnoses     Strain of left ankle, initial encounter    -  1      Care Instructions    Rest the affected painful area as much as possible.  Apply ice for 15-20 minutes intermittently as needed and especially after any offending activity.    Daily stretching.  As pain recedes, begin normal activities slowly as tolerated.      Avoid rapid or heavy exertion for now. Activity as tolerated     Gentle stretching two to three times daily just to keep from stiffening up.      Alternate ice and heat, 20 minutes each for 2-3 cycles.  Gel packs work best for cold. Uncooked rice is best for heat.  Fill an old, clean sock and tie or sew up.  Microwave for 30-45 seconds. Careful not to burn.    Gradually ease back into activity as it gets better.  Consider Physical Therapy if symptoms not better with symptomatic care. Will need to follow-up with your primary care provider for a referral if not improving,         Understanding Ankle Sprain    The ankle is the joint where the leg and foot meet. Bones are held in place by connective tissue called ligaments. When ankle ligaments are stretched to the point of pain and injury, it is called an ankle sprain. A sprain can tear the ligaments. These tears can be very small but still cause pain. Ankle sprains can be mild or severe.  What causes an ankle sprain?  A sprain may occur when you twist your ankle or bend it too far. This can happen when you stumble or fall. Things that can make an ankle sprain more likely include:    Having had an ankle sprain before    Playing sports that involve running and jumping. Or playing contact sports such as football or hockey.    Wearing shoes that  don t support your feet and ankles well    Having ankles with poor strength and flexibility  Symptoms of an ankle sprain  Symptoms may include:    Pain or soreness in the ankle    Swelling    Redness or bruising    Not being able to walk or put weight on the affected foot    Reduced range of motion in the ankle    A popping or tearing feeling at the time the sprain occurs    An abnormal or dislocated look to the ankle    Instability or too much range of motion in the ankle  Treatment for an ankle sprain  Treatment focuses on reducing pain and swelling, and avoiding further injury. Treatments may include:    Resting the ankle. Avoid putting weight on it. This may mean using crutches until the sprain heals.    Prescription or over-the-counter pain medicines. These help reduce swelling and pain.    Cold packs. These help reduce pain and swelling.    Raising your ankle above your heart. This helps reduce swelling.    Wrapping the ankle with an elastic bandage or ankle brace. This helps reduce swelling and gives some support to the ankle. In rare cases, you may need a cast or boot.    Stretching and other exercises. These improve flexibility and strength.    Heat packs. These may be recommended before doing ankle exercises.  Possible complications of an ankle sprain  An ankle that has been weakened by a sprain can be more likely to have repeated sprains afterward. Doing exercises to strengthen your ankle and improve balance can reduce your risk for repeated sprains. Other possible complications are long-term (chronic) pain or an ankle that remains unstable.  When to call your healthcare provider  Call your healthcare provider right away if you have any of these:    Fever of 100.4 F (38 C) or higher, or as directed    Pain, numbness, discoloration, or coldness in the foot or toes    Pain that gets worse    Symptoms that don t get better, or get worse    New symptoms   Date Last Reviewed: 3/10/2016    8717-3253 [employer]. [address] [place] [address]. All rights reserved. This information is not intended as a substitute for professional medical care. Always follow your healthcare professional's instructions.        Treating Ankle Sprains  Treatment will depend on how bad your sprain is. For a severe sprain, healing may take 3 months or more.  Right after your injury: Use R.I.C.E.    Rest: At first, keep weight off the ankle as much as you can. You may be given crutches to help you walk without putting weight on the ankle.    Ice: Put an ice pack on the ankle for 20 minutes. Remove the pack and wait at least 30 minutes. Repeat for up to 3 days. This helps reduce swelling.    Compression: To reduce swelling and keep the joint stable, you may need to wrap the ankle with an elastic bandage. For more severe sprains, you may need an ankle brace, a boot, or a cast.    Elevation: To reduce swelling, keep your ankle raised above your heart when you sit or lie down.  Medicine  Your healthcare provider may suggest oral nonsteroidal anti-inflammatory medicine (NSAIDs), such as ibuprofen. This relieves the pain and helps reduce swelling. Be sure to take your medicine as directed.  Exercises    After about 2 to 3 weeks, you may be given exercises to strengthen the ligaments and muscles in the ankle. Doing these exercises will help prevent another ankle sprain. Exercises may include standing on your toes and then on your heels and doing ankle curls.  Ankle curls    Sit on the edge of a sturdy table or lie on your back.    Pull your toes toward you. Then point them away from you. Repeat for 2 to 3 minutes.   Date Last Reviewed: 1/1/2018 2000-2017 [employer]. [address] [place] [address]. All rights reserved. This information is not intended as a substitute for professional medical care. Always follow your healthcare professional's instructions.                Follow-ups after your  visit        Your next 10 appointments already scheduled     Aug 15, 2018  3:00 PM CDT   New Visit with KATHRYN Fletcher CNP   [facility] ([facility] [place])    [address]  [place] [address] [phone]              Who to contact     If you have questions or need follow up information about today's clinic visit or your schedule please contact [facility] directly at [phone].  Normal or non-critical lab and imaging results will be communicated to you by MyChart, letter or phone within 4 business days after the clinic has received the results. If you do not hear from us within 7 days, please contact the clinic through MyChart or phone. If you have a critical or abnormal lab result, we will notify you by phone as soon as possible.  Submit refill requests through [employer] or call your pharmacy and they will forward the refill request to us. Please allow 3 business days for your refill to be completed.          Additional Information About Your Visit        Care EveryWhere ID     This is your Care EveryWhere ID. This could be used by other organizations to access your [facility] medical records  [id number]        Your Vitals Were     Pulse Temperature Respirations Last Period BMI (Body Mass Index)       68 98.5  F (36.9  C) (Tympanic) 24 06/18/2018 24.14 kg/m2        Blood Pressure from Last 3 Encounters:   07/17/18 126/68   07/02/18 110/62   06/20/18 113/69    Weight from Last 3 Encounters:   07/17/18 153 lb (69.4 kg)   07/02/18 151 lb (68.5 kg)   06/20/18 147 lb (66.7 kg)                 Today's Medication Changes          These changes are accurate as of 7/17/18  8:14 PM.  If you have any questions, ask your nurse or doctor.               Start taking these medicines.        Dose/Directions    order for DME   Used for:  Strain of left ankle, initial encounter   Started by:  Gladys Paula APRN CNP        Ankle stir-up    Quantity:  1 Units   Refills:  0            Where to get your medicines      Some of these will need a paper prescription and others can be bought over the counter.  Ask your nurse if you have questions.     Bring a paper prescription for each of these medications     order for DME                Primary Care Provider Office Phone # Fax #    [facility] [phone] [address]  [facility] [address]        Equal Access to Services     [facility]: Hadii aad ku hadasho Soomaali, waaxda luqadaha, qaybta kaalmada adeegyada, waxay idiin hayradhan adeeg khmarcy laobedn ah. So [facility] [phone].    ATENCIÓN: Si habla espsohan, tiene a mckenzie disposición servicios gratuitos de asistencia lingüística. Llame al [phone].    We comply with applicable federal civil rights laws and [place] laws. We do not discriminate on the basis of race, color, national origin, age, disability, sex, sexual orientation, or gender identity.            Thank you!     Thank you for choosing [facility]  for your care. Our goal is always to provide you with excellent care. Hearing back from our patients is one way we can continue to improve our services. Please take a few minutes to complete the written survey that you may receive in the mail after your visit with us. Thank you!             Your Updated Medication List - Protect others around you: Learn how to safely use, store and throw away your medicines at www.disposemymeds.org.          This list is accurate as of 7/17/18  8:14 PM.  Always use your most recent med list.                   Brand Name Dispense Instructions for use Diagnosis    amitriptyline 10 MG tablet    ELAVIL    30 tablet    Take 1 tablet (10 mg) by mouth At Bedtime    Pain in both upper extremities       diclofenac 1 % Gel topical gel    VOLTAREN    100 g    Apply 2 grams to hands and forearms four times daily PRN using enclosed dosing card.    Bilateral  hand pain, Pain in both forearms       HYDROcodone-acetaminophen 5-325 MG per tablet    NORCO    30 tablet    1 tab bid prn pain    Pain in finger of both hands       order for DME     1 Units    Ankle stir-up    Strain of left ankle, initial encounter       sertraline 50 MG tablet    ZOLOFT    180 tablet    Take 2 tablets (100 mg) by mouth daily    Adjustment disorder with depressed mood       SUMAtriptan 100 MG tablet    IMITREX    12 tablet    Take 1 tablet (100 mg) by mouth at onset of headache for migraine May repeat in 2 hours. Max 2 tablets/24 hours.    Migraine without status migrainosus, not intractable, unspecified migraine type

## 2018-07-18 NOTE — PROGRESS NOTES
SUBJECTIVE:  Ariadne Lowry is a 43 year old female who presents today for left ankle pain.   Injury occurred 3 days ago.    The mechanism of injury includes: inversion strain and twisted  Data Unavailable  Quality:   What makes it worse: WALKING  What makes it better:rest  Associated Signs/ Symptoms:   Treatment: ice  History of recurrent ankle injuries: no  Symptoms are Unchanged  Worsening  Denies any numbness/tingling.    Past Medical History:   Diagnosis Date     Carpal tunnel syndrome     [facility].  Bilateral carpal tunnel injections on 10/3/2016     History   Smoking Status     Former Smoker     Quit date: 11/9/2004   Smokeless Tobacco     Never Used     ROS:  CONSTITUTIONAL:NEGATIVE for fever, chills, change in weight  INTEGUMENTARY/SKIN: NEGATIVE for worrisome rashes, moles or lesions  EYES: NEGATIVE for vision changes or irritation  ENT/MOUTH: NEGATIVE for ear, mouth and throat problems  RESP:NEGATIVE for significant cough or SOB  CV: NEGATIVE for chest pain, palpitations or peripheral edema  NEURO: NEGATIVE for weakness, dizziness or paresthesias      OBJECTIVE:  Last menstrual period 06/18/2018, not currently breastfeeding.  Patient is alert and no  apparent distress.  EXAM:  Constitutional: healthy, alert and no distress   Cardiovascular: negative, PMI normal. No lifts, heaves, or thrills. RRR. No murmurs, clicks gallops or rub  Respiratory: negative, Percussion normal. Good diaphragmatic excursion. Lungs clear  NEURO: Gait normal. Reflexes normal and symmetric. Sensation grossly WNL.    Ankle Exam (left):  Inspection:swelling around the lateral malleolus  swelling around the medial malleolus  bruising around the lateral malleolus  bruising around the medial malleolus  Palpation:non-tender throughout  Good doralis pedis and posterior tibial pulses  Neurovascularly Intact Distally.   Special Tests: Pain with inversion  Pain with eversion    X-Ray:   ANKLE THREE VIEWS LEFT 7/17/2018 8:01 PM       HISTORY: twisted ankle pain lateral aspects; Strain of left ankle,  initial encounter     COMPARISON: None.         IMPRESSION: Mild soft tissue swelling laterally. No acute fracture or  dislocation.     DENA EARLY MD    ASSESSMENT:    ICD-10-CM    1. Strain of left ankle, initial encounter S96.912A XR Ankle Left G/E 3 Views     order for DME     DISCONTINUED: order for DME     DISCONTINUED: HYDROcodone-acetaminophen (NORCO) 5-325 MG per tablet         PLAN:  Patient Instructions     Rest the affected painful area as much as possible.  Apply ice for 15-20 minutes intermittently as needed and especially after any offending activity.    Daily stretching.  As pain recedes, begin normal activities slowly as tolerated.      Avoid rapid or heavy exertion for now. Activity as tolerated     Gentle stretching two to three times daily just to keep from stiffening up.      Alternate ice and heat, 20 minutes each for 2-3 cycles.  Gel packs work best for cold. Uncooked rice is best for heat.  Fill an old, clean sock and tie or sew up.  Microwave for 30-45 seconds. Careful not to burn.    Gradually ease back into activity as it gets better.  Consider Physical Therapy if symptoms not better with symptomatic care. Will need to follow-up with your primary care provider for a referral if not improving,         Understanding Ankle Sprain    The ankle is the joint where the leg and foot meet. Bones are held in place by connective tissue called ligaments. When ankle ligaments are stretched to the point of pain and injury, it is called an ankle sprain. A sprain can tear the ligaments. These tears can be very small but still cause pain. Ankle sprains can be mild or severe.  What causes an ankle sprain?  A sprain may occur when you twist your ankle or bend it too far. This can happen when you stumble or fall. Things that can make an ankle sprain more likely include:    Having had an ankle sprain before    Playing sports that  involve running and jumping. Or playing contact sports such as football or hockey.    Wearing shoes that don t support your feet and ankles well    Having ankles with poor strength and flexibility  Symptoms of an ankle sprain  Symptoms may include:    Pain or soreness in the ankle    Swelling    Redness or bruising    Not being able to walk or put weight on the affected foot    Reduced range of motion in the ankle    A popping or tearing feeling at the time the sprain occurs    An abnormal or dislocated look to the ankle    Instability or too much range of motion in the ankle  Treatment for an ankle sprain  Treatment focuses on reducing pain and swelling, and avoiding further injury. Treatments may include:    Resting the ankle. Avoid putting weight on it. This may mean using crutches until the sprain heals.    Prescription or over-the-counter pain medicines. These help reduce swelling and pain.    Cold packs. These help reduce pain and swelling.    Raising your ankle above your heart. This helps reduce swelling.    Wrapping the ankle with an elastic bandage or ankle brace. This helps reduce swelling and gives some support to the ankle. In rare cases, you may need a cast or boot.    Stretching and other exercises. These improve flexibility and strength.    Heat packs. These may be recommended before doing ankle exercises.  Possible complications of an ankle sprain  An ankle that has been weakened by a sprain can be more likely to have repeated sprains afterward. Doing exercises to strengthen your ankle and improve balance can reduce your risk for repeated sprains. Other possible complications are long-term (chronic) pain or an ankle that remains unstable.  When to call your healthcare provider  Call your healthcare provider right away if you have any of these:    Fever of 100.4 F (38 C) or higher, or as directed    Pain, numbness, discoloration, or coldness in the foot or toes    Pain that gets worse    Symptoms  that don t get better, or get worse    New symptoms   Date Last Reviewed: 3/10/2016    7457-6659 [employer]. [address] [place] [address]. All rights reserved. This information is not intended as a substitute for professional medical care. Always follow your healthcare professional's instructions.        Treating Ankle Sprains  Treatment will depend on how bad your sprain is. For a severe sprain, healing may take 3 months or more.  Right after your injury: Use R.I.C.E.    Rest: At first, keep weight off the ankle as much as you can. You may be given crutches to help you walk without putting weight on the ankle.    Ice: Put an ice pack on the ankle for 20 minutes. Remove the pack and wait at least 30 minutes. Repeat for up to 3 days. This helps reduce swelling.    Compression: To reduce swelling and keep the joint stable, you may need to wrap the ankle with an elastic bandage. For more severe sprains, you may need an ankle brace, a boot, or a cast.    Elevation: To reduce swelling, keep your ankle raised above your heart when you sit or lie down.  Medicine  Your healthcare provider may suggest oral nonsteroidal anti-inflammatory medicine (NSAIDs), such as ibuprofen. This relieves the pain and helps reduce swelling. Be sure to take your medicine as directed.  Exercises    After about 2 to 3 weeks, you may be given exercises to strengthen the ligaments and muscles in the ankle. Doing these exercises will help prevent another ankle sprain. Exercises may include standing on your toes and then on your heels and doing ankle curls.  Ankle curls    Sit on the edge of a sturdy table or lie on your back.    Pull your toes toward you. Then point them away from you. Repeat for 2 to 3 minutes.   Date Last Reviewed: 1/1/2018 2000-2017 [employer]. [address] [place] [address]. All rights reserved. This information is not intended as a substitute for professional medical  care. Always follow your healthcare professional's instructions.            KATHRYN Chapman CNP

## 2018-07-18 NOTE — PATIENT INSTRUCTIONS
Rest the affected painful area as much as possible.  Apply ice for 15-20 minutes intermittently as needed and especially after any offending activity.    Daily stretching.  As pain recedes, begin normal activities slowly as tolerated.      Avoid rapid or heavy exertion for now. Activity as tolerated     Gentle stretching two to three times daily just to keep from stiffening up.      Alternate ice and heat, 20 minutes each for 2-3 cycles.  Gel packs work best for cold. Uncooked rice is best for heat.  Fill an old, clean sock and tie or sew up.  Microwave for 30-45 seconds. Careful not to burn.    Gradually ease back into activity as it gets better.  Consider Physical Therapy if symptoms not better with symptomatic care. Will need to follow-up with your primary care provider for a referral if not improving,         Understanding Ankle Sprain    The ankle is the joint where the leg and foot meet. Bones are held in place by connective tissue called ligaments. When ankle ligaments are stretched to the point of pain and injury, it is called an ankle sprain. A sprain can tear the ligaments. These tears can be very small but still cause pain. Ankle sprains can be mild or severe.  What causes an ankle sprain?  A sprain may occur when you twist your ankle or bend it too far. This can happen when you stumble or fall. Things that can make an ankle sprain more likely include:    Having had an ankle sprain before    Playing sports that involve running and jumping. Or playing contact sports such as football or hockey.    Wearing shoes that don t support your feet and ankles well    Having ankles with poor strength and flexibility  Symptoms of an ankle sprain  Symptoms may include:    Pain or soreness in the ankle    Swelling    Redness or bruising    Not being able to walk or put weight on the affected foot    Reduced range of motion in the ankle    A popping or tearing feeling at the time the sprain occurs    An abnormal or  dislocated look to the ankle    Instability or too much range of motion in the ankle  Treatment for an ankle sprain  Treatment focuses on reducing pain and swelling, and avoiding further injury. Treatments may include:    Resting the ankle. Avoid putting weight on it. This may mean using crutches until the sprain heals.    Prescription or over-the-counter pain medicines. These help reduce swelling and pain.    Cold packs. These help reduce pain and swelling.    Raising your ankle above your heart. This helps reduce swelling.    Wrapping the ankle with an elastic bandage or ankle brace. This helps reduce swelling and gives some support to the ankle. In rare cases, you may need a cast or boot.    Stretching and other exercises. These improve flexibility and strength.    Heat packs. These may be recommended before doing ankle exercises.  Possible complications of an ankle sprain  An ankle that has been weakened by a sprain can be more likely to have repeated sprains afterward. Doing exercises to strengthen your ankle and improve balance can reduce your risk for repeated sprains. Other possible complications are long-term (chronic) pain or an ankle that remains unstable.  When to call your healthcare provider  Call your healthcare provider right away if you have any of these:    Fever of 100.4 F (38 C) or higher, or as directed    Pain, numbness, discoloration, or coldness in the foot or toes    Pain that gets worse    Symptoms that don t get better, or get worse    New symptoms   Date Last Reviewed: 3/10/2016    3214-1396 [employer]. [address] [place] [address]. All rights reserved. This information is not intended as a substitute for professional medical care. Always follow your healthcare professional's instructions.        Treating Ankle Sprains  Treatment will depend on how bad your sprain is. For a severe sprain, healing may take 3 months or more.  Right after your injury: Use  R.I.C.E.    Rest: At first, keep weight off the ankle as much as you can. You may be given crutches to help you walk without putting weight on the ankle.    Ice: Put an ice pack on the ankle for 20 minutes. Remove the pack and wait at least 30 minutes. Repeat for up to 3 days. This helps reduce swelling.    Compression: To reduce swelling and keep the joint stable, you may need to wrap the ankle with an elastic bandage. For more severe sprains, you may need an ankle brace, a boot, or a cast.    Elevation: To reduce swelling, keep your ankle raised above your heart when you sit or lie down.  Medicine  Your healthcare provider may suggest oral nonsteroidal anti-inflammatory medicine (NSAIDs), such as ibuprofen. This relieves the pain and helps reduce swelling. Be sure to take your medicine as directed.  Exercises    After about 2 to 3 weeks, you may be given exercises to strengthen the ligaments and muscles in the ankle. Doing these exercises will help prevent another ankle sprain. Exercises may include standing on your toes and then on your heels and doing ankle curls.  Ankle curls    Sit on the edge of a sturdy table or lie on your back.    Pull your toes toward you. Then point them away from you. Repeat for 2 to 3 minutes.   Date Last Reviewed: 1/1/2018 2000-2017 [employer]. [address] [place] [address], [place] [address]. All rights reserved. This information is not intended as a substitute for professional medical care. Always follow your healthcare professional's instructions.

## 2018-07-18 NOTE — NURSING NOTE
"Chief Complaint   Patient presents with     Musculoskeletal Problem     Twisted Left ankle on Saturday. Left ankle. Ankle is purple and dark. has done ice and elevation and Ibuprofen        Initial /68 (BP Location: Right arm, Cuff Size: Adult Regular)  Pulse 68  Temp 98.5  F (36.9  C) (Tympanic)  Resp 24  Wt 153 lb (69.4 kg)  LMP 06/18/2018  BMI 24.14 kg/m2 Estimated body mass index is 24.14 kg/(m^2) as calculated from the following:    Height as of 6/20/18: 5' 6.75\" (1.695 m).    Weight as of this encounter: 153 lb (69.4 kg).      Health Maintenance that is potentially due pending provider review:  NONE    n/a    Is there anyone who you would like to be able to receive your results? Not Applicable  If yes have patient fill out LORI Cm M.A.          "

## 2019-01-04 DIAGNOSIS — F43.21 ADJUSTMENT DISORDER WITH DEPRESSED MOOD: ICD-10-CM

## 2019-01-04 NOTE — TELEPHONE ENCOUNTER
"Requested Prescriptions   Pending Prescriptions Disp Refills     sertraline (ZOLOFT) 50 MG tablet [Pharmacy Med Name: SERTRALINE 50MG TABLETS] 180 tablet 0     Sig: TAKE 2 TABLETS BY MOUTH DAILY    SSRIs Protocol Failed - 1/4/2019  8:15 AM       Failed - PHQ-9 score less than 5 in past 6 months    Please review last PHQ-9 score.          Passed - Patient is age 18 or older       Passed - No active pregnancy on record       Passed - No positive pregnancy test in last 12 months       Passed - Recent (6 mo) or future (30 days) visit within the authorizing provider's specialty    Patient had office visit in the last 6 months or has a visit in the next 30 days with authorizing provider or within the authorizing provider's specialty.  See \"Patient Info\" tab in inbasket, or \"Choose Columns\" in Meds & Orders section of the refill encounter.            sertraline (ZOLOFT) 50 MG tablet  Last Written Prescription Date:  06/04/2018  Last Fill Quantity: 180 tablet,  # refills: 1   Last office visit: 7/2/2018 with prescribing provider:  CLIFF Hand   Future Office Visit:      PHQ-9 SCORE 6/4/2018   PHQ-9 Total Score 8     HORACIO-7 SCORE 6/4/2018   Total Score 1       Gaye PANDYA (R) (M)    "

## 2019-03-16 DIAGNOSIS — G43.909 MIGRAINE WITHOUT STATUS MIGRAINOSUS, NOT INTRACTABLE, UNSPECIFIED MIGRAINE TYPE: ICD-10-CM

## 2019-03-16 NOTE — TELEPHONE ENCOUNTER
"Requested Prescriptions   Pending Prescriptions Disp Refills     SUMAtriptan (IMITREX) 100 MG tablet  Last Written Prescription Date:  3/7/18  Last Fill Quantity: 12,  # refills: 11   Last office visit: 5/3/2018 with prescribing provider:  JASMIN Hoskins   Future Office Visit:     12 tablet 11     Sig: Take 1 tablet (100 mg) by mouth at onset of headache for migraine May repeat in 2 hours. Max 2 tablets/24 hours.    Serotonin Agonists Failed - 3/16/2019  8:47 AM       Failed - Serotonin Agonist request needs review.    Please review patient's record. If patient has had 8 or more treatments in the past month, please forward to provider.         Passed - Blood pressure under 140/90 in past 12 months    BP Readings from Last 3 Encounters:   07/17/18 126/68   07/02/18 110/62   06/20/18 113/69                Passed - Recent (12 mo) or future (30 days) visit within the authorizing provider's specialty    Patient had office visit in the last 12 months or has a visit in the next 30 days with authorizing provider or within the authorizing provider's specialty.  See \"Patient Info\" tab in inbasket, or \"Choose Columns\" in Meds & Orders section of the refill encounter.             Passed - Medication is active on med list       Passed - Patient is age 18 or older       Passed - No active pregnancy on record       Passed - No positive pregnancy test in past 12 months          "

## 2019-03-18 RX ORDER — SUMATRIPTAN 100 MG/1
100 TABLET, FILM COATED ORAL
Qty: 12 TABLET | Refills: 2 | Status: SHIPPED | OUTPATIENT
Start: 2019-03-18 | End: 2019-06-24

## 2019-04-18 DIAGNOSIS — F43.21 ADJUSTMENT DISORDER WITH DEPRESSED MOOD: ICD-10-CM

## 2019-04-18 NOTE — TELEPHONE ENCOUNTER
"Requested Prescriptions   Pending Prescriptions Disp Refills     sertraline (ZOLOFT) 50 MG tablet [Pharmacy Med Name: SERTRALINE HCL 50MG TABS] 60 tablet 0     Sig: TAKE TWO TABLETS BY MOUTH ONCE DAILY       SSRIs Protocol Failed - 4/18/2019 12:13 PM        Failed - PHQ-9 score less than 5 in past 6 months     Please review last PHQ-9 score.     PHQ-9 SCORE 6/4/2018   PHQ-9 Total Score 8     HORACIO-7 SCORE 6/4/2018   Total Score 1                 Failed - Recent (6 mo) or future (30 days) visit within the authorizing provider's specialty     Patient had office visit in the last 6 months or has a visit in the next 30 days with authorizing provider or within the authorizing provider's specialty.  See \"Patient Info\" tab in inbasket, or \"Choose Columns\" in Meds & Orders section of the refill encounter.      Last Written Prescription Date:  1/7/19  Last Fill Quantity: 180,  # refills: 0   Last office visit: 7/2/2018 with prescribing provider:     Future Office Visit:              Passed - Medication is active on med list        Passed - Patient is age 18 or older        Passed - No active pregnancy on record        Passed - No positive pregnancy test in last 12 months          "

## 2019-04-23 ENCOUNTER — TELEPHONE (OUTPATIENT)
Dept: FAMILY MEDICINE | Facility: CLINIC | Age: 44
End: 2019-04-23

## 2019-04-23 NOTE — TELEPHONE ENCOUNTER
[facility], preventive therapy dated 04/22/2019, sent to PCP.    Y[facility]ely Gupta  [facility]

## 2019-05-20 ENCOUNTER — OFFICE VISIT (OUTPATIENT)
Dept: FAMILY MEDICINE | Facility: CLINIC | Age: 44
End: 2019-05-20
Payer: COMMERCIAL

## 2019-05-20 VITALS
TEMPERATURE: 98 F | BODY MASS INDEX: 24.96 KG/M2 | DIASTOLIC BLOOD PRESSURE: 80 MMHG | HEIGHT: 67 IN | WEIGHT: 159 LBS | RESPIRATION RATE: 18 BRPM | HEART RATE: 72 BPM | SYSTOLIC BLOOD PRESSURE: 142 MMHG

## 2019-05-20 DIAGNOSIS — F43.21 ADJUSTMENT DISORDER WITH DEPRESSED MOOD: ICD-10-CM

## 2019-05-20 DIAGNOSIS — N95.1 MENOPAUSAL SYMPTOM: Primary | ICD-10-CM

## 2019-05-20 DIAGNOSIS — N93.8 DUB (DYSFUNCTIONAL UTERINE BLEEDING): ICD-10-CM

## 2019-05-20 LAB
BASOPHILS # BLD AUTO: 0 10E9/L (ref 0–0.2)
BASOPHILS NFR BLD AUTO: 0.1 %
DIFFERENTIAL METHOD BLD: NORMAL
EOSINOPHIL # BLD AUTO: 0.2 10E9/L (ref 0–0.7)
EOSINOPHIL NFR BLD AUTO: 1.8 %
ERYTHROCYTE [DISTWIDTH] IN BLOOD BY AUTOMATED COUNT: 12.7 % (ref 10–15)
HCT VFR BLD AUTO: 41.5 % (ref 35–47)
HGB BLD-MCNC: 13.9 G/DL (ref 11.7–15.7)
LYMPHOCYTES # BLD AUTO: 3.2 10E9/L (ref 0.8–5.3)
LYMPHOCYTES NFR BLD AUTO: 38.7 %
MCH RBC QN AUTO: 31.2 PG (ref 26.5–33)
MCHC RBC AUTO-ENTMCNC: 33.5 G/DL (ref 31.5–36.5)
MCV RBC AUTO: 93 FL (ref 78–100)
MONOCYTES # BLD AUTO: 1 10E9/L (ref 0–1.3)
MONOCYTES NFR BLD AUTO: 11.5 %
NEUTROPHILS # BLD AUTO: 4 10E9/L (ref 1.6–8.3)
NEUTROPHILS NFR BLD AUTO: 47.9 %
PLATELET # BLD AUTO: 341 10E9/L (ref 150–450)
RBC # BLD AUTO: 4.45 10E12/L (ref 3.8–5.2)
TSH SERPL DL<=0.005 MIU/L-ACNC: 2.83 MU/L (ref 0.4–4)
WBC # BLD AUTO: 8.3 10E9/L (ref 4–11)

## 2019-05-20 PROCEDURE — 85025 COMPLETE CBC W/AUTO DIFF WBC: CPT | Performed by: NURSE PRACTITIONER

## 2019-05-20 PROCEDURE — 82670 ASSAY OF TOTAL ESTRADIOL: CPT | Performed by: NURSE PRACTITIONER

## 2019-05-20 PROCEDURE — 84443 ASSAY THYROID STIM HORMONE: CPT | Performed by: NURSE PRACTITIONER

## 2019-05-20 PROCEDURE — 83002 ASSAY OF GONADOTROPIN (LH): CPT | Performed by: NURSE PRACTITIONER

## 2019-05-20 PROCEDURE — 36415 COLL VENOUS BLD VENIPUNCTURE: CPT | Performed by: NURSE PRACTITIONER

## 2019-05-20 PROCEDURE — 83001 ASSAY OF GONADOTROPIN (FSH): CPT | Performed by: NURSE PRACTITIONER

## 2019-05-20 PROCEDURE — 99214 OFFICE O/P EST MOD 30 MIN: CPT | Performed by: NURSE PRACTITIONER

## 2019-05-20 ASSESSMENT — PATIENT HEALTH QUESTIONNAIRE - PHQ9
10. IF YOU CHECKED OFF ANY PROBLEMS, HOW DIFFICULT HAVE THESE PROBLEMS MADE IT FOR YOU TO DO YOUR WORK, TAKE CARE OF THINGS AT HOME, OR GET ALONG WITH OTHER PEOPLE: VERY DIFFICULT
SUM OF ALL RESPONSES TO PHQ QUESTIONS 1-9: 17
SUM OF ALL RESPONSES TO PHQ QUESTIONS 1-9: 17

## 2019-05-20 ASSESSMENT — ANXIETY QUESTIONNAIRES
6. BECOMING EASILY ANNOYED OR IRRITABLE: MORE THAN HALF THE DAYS
7. FEELING AFRAID AS IF SOMETHING AWFUL MIGHT HAPPEN: NOT AT ALL
GAD7 TOTAL SCORE: 5
5. BEING SO RESTLESS THAT IT IS HARD TO SIT STILL: MORE THAN HALF THE DAYS
7. FEELING AFRAID AS IF SOMETHING AWFUL MIGHT HAPPEN: NOT AT ALL
1. FEELING NERVOUS, ANXIOUS, OR ON EDGE: NOT AT ALL
3. WORRYING TOO MUCH ABOUT DIFFERENT THINGS: NOT AT ALL
4. TROUBLE RELAXING: NOT AT ALL
2. NOT BEING ABLE TO STOP OR CONTROL WORRYING: SEVERAL DAYS
GAD7 TOTAL SCORE: 5
GAD7 TOTAL SCORE: 5

## 2019-05-20 ASSESSMENT — MIFFLIN-ST. JEOR: SCORE: 1394.88

## 2019-05-20 NOTE — LETTER
My Depression Action Plan  Name: Ariadne Lowry   Date of Birth 1975  Date: 5/20/2019    My doctor: Young Hoskins   My clinic: [address] [id number] [phone]          GREEN    ZONE   Good Control    What it looks like:     Things are going generally well. You have normal up s and down s. You may even feel depressed from time to time, but bad moods usually last less than a day.   What you need to do:  1. Continue to care for yourself (see self care plan)  2. Check your depression survival kit and update it as needed  3. Follow your physician s recommendations including any medication.  4. Do not stop taking medication unless you consult with your physician first.           YELLOW         ZONE Getting Worse    What it looks like:     Depression is starting to interfere with your life.     It may be hard to get out of bed; you may be starting to isolate yourself from others.    Symptoms of depression are starting to last most all day and this has happened for several days.     You may have suicidal thoughts but they are not constant.   What you need to do:     1. Call your care team, your response to treatment will improve if you keep your care team informed of your progress. Yellow periods are signs an adjustment may need to be made.     2. Continue your self-care, even if you have to fake it!    3. Talk to someone in your support network    4. Open up your depression survival kit           RED    ZONE Medical Alert - Get Help    What it looks like:     Depression is seriously interfering with your life.     You may experience these or other symptoms: You can t get out of bed most days, can t work or engage in other necessary activities, you have trouble taking care of basic hygiene, or basic responsibilities, thoughts of suicide or death that will not go away, self-injurious behavior.     What you need to do:  1. Call your care team and  request a same-day appointment. If they are not available (weekends or after hours) call your local crisis line, emergency room or 911.            Depression Self Care Plan / Survival Kit    Self-Care for Depression  Here s the deal. Your body and mind are really not as separate as most people think.  What you do and think affects how you feel and how you feel influences what you do and think. This means if you do things that people who feel good do, it will help you feel better.  Sometimes this is all it takes.  There is also a place for medication and therapy depending on how severe your depression is, so be sure to consult with your medical provider and/ or Behavioral Health Consultant if your symptoms are worsening or not improving.     In order to better manage my stress, I will:    Exercise  Get some form of exercise, every day. This will help reduce pain and release endorphins, the  feel good  chemicals in your brain. This is almost as good as taking antidepressants!  This is not the same as joining a gym and then never going! (they count on that by the way ) It can be as simple as just going for a walk or doing some gardening, anything that will get you moving.      Hygiene   Maintain good hygiene (Get out of bed in the morning, Make your bed, Brush your teeth, Take a shower, and Get dressed like you were going to work, even if you are unemployed).  If your clothes don't fit try to get ones that do.    Diet  I will strive to eat foods that are good for me, drink plenty of water, and avoid excessive sugar, caffeine, alcohol, and other mood-altering substances.  Some foods that are helpful in depression are: complex carbohydrates, B vitamins, flaxseed, fish or fish oil, fresh fruits and vegetables.    Psychotherapy  I agree to participate in Individual Therapy (if recommended).    Medication  If prescribed medications, I agree to take them.  Missing doses can result in serious side effects.  I understand that  drinking alcohol, or other illicit drug use, may cause potential side effects.  I will not stop my medication abruptly without first discussing it with my provider.    Staying Connected With Others  I will stay in touch with my friends, family members, and my primary care provider/team.    Use your imagination  Be creative.  We all have a creative side; it doesn t matter if it s oil painting, sand castles, or mud pies! This will also kick up the endorphins.    Witness Beauty  (AKA stop and smell the roses) Take a look outside, even in mid-winter. Notice colors, textures. Watch the squirrels and birds.     Service to others  Be of service to others.  There is always someone else in need.  By helping others we can  get out of ourselves  and remember the really important things.  This also provides opportunities for practicing all the other parts of the program.    Humor  Laugh and be silly!  Adjust your TV habits for less news and crime-drama and more comedy.    Control your stress  Try breathing deep, massage therapy, biofeedback, and meditation. Find time to relax each day.     My support system    Clinic Contact:  Phone number:    Contact 1:  Phone number:    Contact 2:  Phone number:    Latter day/:  Phone number:    Therapist:  Phone number:    Local crisis center:    Phone number:    Other community support:  Phone number:

## 2019-05-20 NOTE — PATIENT INSTRUCTIONS
Patient Education     Understanding Menopause  Menopause marks the point where you ve gone 12 months in a row without a period. The average age for this is around 51, but it can happen at younger or older ages. During the months or years before menopause, your body goes through many changes. It may be helpful to understand these changes and what you can do about the symptoms that result.     Use a portable fan to help stay cool.    Symptoms  Perimenopause is sometimes called the menopause transition. It happens in the months or years before menopause. It may begin when you reach your mid-40s. During this time, your estrogen levels go up and down and then decrease. As a result, you may notice some of the following symptoms:    Menstrual periods that come more or less often than usual    Menstrual periods that are lighter or heavier than normal    Increased premenstrual syndrome (PMS) symptoms    Hot flashes    Night sweats    Mood swings    Vaginal dryness with possible painful sexual activity    Difficulty going to sleep or staying asleep    Decreased sexual drive and function    Urinating frequently  It is important to remember that you could become pregnant until 12 months have passed since your last menstrual period. Ask your healthcare provider about birth control choices.   Controlling symptoms  Your healthcare provider may suggest pills or an intrauterine device (IUD) that contain the hormone progesterone. This can make your periods more regular and prevent excess bleeding. If you have symptoms due to lower estrogen levels, your healthcare provider may suggest pills that contain estrogen and/or progesterone. This is called hormone therapy (HT).  There are also other prescription medicines that help control some of the bothersome symptoms, like hot flashes, mood swings, and vaginal dryness.  Other ways for you to deal with symptoms are listed below.    Hot flashes. Wear layers that you can remove. Try  all-cotton clothing, sheets, and blankets. Keep a glass of cold water by your bed.    Pain during sex. You can buy a water-based lubricant or vaginal moisturizer in the drugstore that may help. Your healthcare provider may also prescribe an estrogen cream for your vagina.    Mood swings. Talking to friends who are going through the same changes can sometimes help.  Date Last Reviewed: 12/1/2016 2000-2018 [employer]. [address] [place] [address], [place] [address]. All rights reserved. This information is not intended as a substitute for professional medical care. Always follow your healthcare professional's instructions.           Patient Education     Perimenopause  Menopause is when you stop having periods for good. For many women, this happens around age 50. The time of change before this is called perimenopause. It may start in your late 30s or 40s. It can last for months or years. During this time, your body makes lower levels of female hormones. This causes certain changes in your body. You may already have begun to feel the effects of these changes. By taking steps to relieve symptoms, you can still feel good.    The menstrual cycle  Hormones are chemicals that have specific jobs in the body. A menstrual cycle is caused by changes in the levels of 2 female hormones. These hormones are estrogen and progesterone. They are made by the ovaries. In a normal cycle, estrogen creates a lining in the uterus to allow for pregnancy. The ovary then releases an egg. This is called ovulation. Progesterone levels start to go up. If the egg is not fertilized, progesterone levels go down. This causes the lining in the uterus to be shed. This is the bleeding that is your period.  Changes in hormones  As a woman ages, her ovaries begin to make hormones less regularly. In some months, there may not be ovulation. Without ovulation, progesterone isn't secreted so a normal period doesn't occur. The menstrual cycle will  be harder to predict. Over time, the ovaries stop working. This can cause symptoms. Some women who have had their uterus taken out (hysterectomy) but still have ovaries may still have symptoms. When estrogen levels reach their lowest point, periods will stop completely. This is menopause.  Symptoms of perimenopause  The change in hormones can cause physical symptoms. It can also cause emotional symptoms. These may include:    Periods that come more or less often    Periods that are lighter or heavier than you re used to    Hot flashes, night sweats, or trouble sleeping    Vaginal dryness, which may make sex painful    Mood swings or fatigue    Palpitations    Sleep disturbances    Urinary symptoms including frequency and incontinence  Medicines that may help  Some medicines can help ease the effects of perimenopause. These include:    Low-dose birth control pills. These often contain both estrogen and progesterone. They can help regulate your periods.    Hormone therapy (HT). This replaces some of the hormones your body has stopped making.    Antidepressants. These help balance brain chemicals that may decrease during this time. Signs of depression can include often feeling sad or hopeless. If you feel this way, be sure to talk to your healthcare provider.    Gabapentin. This is a medicine also used to treat seizures. This controls hot flashes and night sweats in some women.    Clonidine. This medicine may control hot flashes and night sweats.  Date Last Reviewed: 11/1/2017 2000-2018 [employer]. [address] [place] [address], [place] [address]. All rights reserved. This information is not intended as a substitute for professional medical care. Always follow your healthcare professional's instructions.

## 2019-05-20 NOTE — NURSING NOTE
"Chief Complaint   Patient presents with     Menopausal Sx       Initial /80 (BP Location: Right arm, Cuff Size: Adult Regular)   Pulse 72   Temp 98  F (36.7  C) (Tympanic)   Resp 18   Ht 1.695 m (5' 6.75\")   Wt 72.1 kg (159 lb)   BMI 25.09 kg/m   Estimated body mass index is 25.09 kg/m  as calculated from the following:    Height as of this encounter: 1.695 m (5' 6.75\").    Weight as of this encounter: 72.1 kg (159 lb).    Patient presents to the clinic using No DME    Health Maintenance that is potentially due pending provider review:  Pap Smear    Patient will schedule a physical.    Is there anyone who you would like to be able to receive your results? No  If yes have patient fill out LORI      "

## 2019-05-20 NOTE — PROGRESS NOTES
Subjective     Ariadne Lowry is a 44 year old female who presents to clinic today for the following health issues:    HPI     Patient would like to discuss her menopause symptoms and weight gain.    Depression Followup    How are you doing with your depression since your last visit? Waxing and waning    Are you having other symptoms that might be associated with depression? No    Have you had a significant life event?  No     Are you feeling anxious or having panic attacks?   No    Do you have any concerns with your use of alcohol or other drugs? No    Social History     Tobacco Use     Smoking status: Former Smoker     Last attempt to quit: 2004     Years since quittin.5     Smokeless tobacco: Never Used   Substance Use Topics     Alcohol use: No     Drug use: No     PHQ 2018   PHQ-9 Total Score 8 17   Q9: Thoughts of better off dead/self-harm past 2 weeks Not at all Several days   F/U: Thoughts of suicide or self-harm - No   F/U: Safety concerns - No     HORACIO-7 SCORE 2018   Total Score - 5 (mild anxiety)   Total Score 1 5       In the past two weeks have you had thoughts of suicide or self-harm?  No.    Do you have concerns about your personal safety or the safety of others?   No    Suicide Assessment Five-step Evaluation and Treatment (SAFE-T)         {  Patient Active Problem List   Diagnosis     Cervical radicular pain     Migraine without status migrainosus, not intractable, unspecified migraine type     Bilateral carpal tunnel syndrome     Adjustment disorder with depressed mood     History reviewed. No pertinent surgical history.    Social History     Tobacco Use     Smoking status: Former Smoker     Last attempt to quit: 2004     Years since quittin.5     Smokeless tobacco: Never Used   Substance Use Topics     Alcohol use: No     Family History   Problem Relation Age of Onset     Seasonal/Environmental Allergies Mother      Thyroid Disease Mother      Heart  Disease Father      Neurologic Disorder Father      Heart Disease Maternal Grandfather      Alcoholism Paternal Grandfather      Depression Daughter      Psychotic Disorder Daughter          Current Outpatient Medications   Medication Sig Dispense Refill     sertraline (ZOLOFT) 50 MG tablet TAKE TWO TABLETS BY MOUTH ONCE DAILY 60 tablet 0     SUMAtriptan (IMITREX) 100 MG tablet Take 1 tablet (100 mg) by mouth at onset of headache for migraine May repeat in 2 hours. Max 2 tablets/24 hours. 12 tablet 2     HYDROcodone-acetaminophen (NORCO) 5-325 MG per tablet 1 tab bid prn pain (Patient not taking: Reported on 5/20/2019) 30 tablet 0     No Known Allergies  Recent Labs   Lab Test 03/07/18  1658 01/29/18  1807   ALT  --  24   CR  --  0.77   GFRESTIMATED  --  82   GFRESTBLACK  --  >90   POTASSIUM  --  4.8   TSH 3.11  --       BP Readings from Last 3 Encounters:   05/20/19 142/80   07/17/18 126/68   07/02/18 110/62    Wt Readings from Last 3 Encounters:   05/20/19 72.1 kg (159 lb)   07/17/18 69.4 kg (153 lb)   07/02/18 68.5 kg (151 lb)                    Reviewed and updated as needed this visit by Provider         Review of Systems   ROS COMP: Constitutional, HEENT, cardiovascular, pulmonary, gi and gu systems are negative, except as otherwise noted.      Objective    There were no vitals taken for this visit.  There is no height or weight on file to calculate BMI.  Physical Exam   GENERAL: healthy, alert and no distress  EYES: Eyes grossly normal to inspection, PERRL and conjunctivae and sclerae normal  HENT: ear canals and TM's normal, nose and mouth without ulcers or lesions  NECK: no adenopathy, no asymmetry, masses, or scars and thyroid normal to palpation  RESP: lungs clear to auscultation - no rales, rhonchi or wheezes  CV: regular rate and rhythm, normal S1 S2, no S3 or S4, no murmur, click or rub, no peripheral edema and peripheral pulses strong  MS: no gross musculoskeletal defects noted, no edema  SKIN: no  suspicious lesions or rashes  NEURO: Normal strength and tone, mentation intact and speech normal  PSYCH: mentation appears normal, affect normal/bright    Results for orders placed or performed in visit on 05/20/19   TSH with free T4 reflex   Result Value Ref Range    TSH 2.83 0.40 - 4.00 mU/L   Follicle stimulating hormone   Result Value Ref Range    FSH 9.5 IU/L   CBC with platelets differential   Result Value Ref Range    WBC 8.3 4.0 - 11.0 10e9/L    RBC Count 4.45 3.8 - 5.2 10e12/L    Hemoglobin 13.9 11.7 - 15.7 g/dL    Hematocrit 41.5 35.0 - 47.0 %    MCV 93 78 - 100 fl    MCH 31.2 26.5 - 33.0 pg    MCHC 33.5 31.5 - 36.5 g/dL    RDW 12.7 10.0 - 15.0 %    Platelet Count 341 150 - 450 10e9/L    % Neutrophils 47.9 %    % Lymphocytes 38.7 %    % Monocytes 11.5 %    % Eosinophils 1.8 %    % Basophils 0.1 %    Absolute Neutrophil 4.0 1.6 - 8.3 10e9/L    Absolute Lymphocytes 3.2 0.8 - 5.3 10e9/L    Absolute Monocytes 1.0 0.0 - 1.3 10e9/L    Absolute Eosinophils 0.2 0.0 - 0.7 10e9/L    Absolute Basophils 0.0 0.0 - 0.2 10e9/L    Diff Method Automated Method    Lutropin   Result Value Ref Range    Lutropin 11.0 IU/L   Estradiol   Result Value Ref Range    Estradiol 60 pg/mL             Assessment & Plan     1. Menopausal symptom  Patient has had irregular periods has increased anxiety that waxes and wanes.  Patient concerned that she is menopausal causing increased anxiety depression in the DUB.  Patient will go 3 to 4 months with over..  Patient presently is on Zoloft for depression.  Did review with patient increasing medication no change in medication patient would like to have lab works completed and follow-up with OB/GYN due to her concern for perimenopause.  Labs obtained within normal limits will have patient follow-up with OB/GYN.  If depression gets worse with consider increasing Zoloft changing medication regiment depression patient will contact me if she would like to have change in her medications we will  "follow-up with OB/GYN first she states.  - TSH with free T4 reflex  - Follicle stimulating hormone  - CBC with platelets differential  - Lutropin  - Estradiol  - OB/GYN REFERRAL    2. DUB (dysfunctional uterine bleeding)    - OB/GYN REFERRAL     BMI:   Estimated body mass index is 25.09 kg/m  as calculated from the following:    Height as of this encounter: 1.695 m (5' 6.75\").    Weight as of this encounter: 72.1 kg (159 lb).   Weight management plan: Discussed healthy diet and exercise guidelines            No follow-ups on file.    KATRHYN Chapman [facility]    "

## 2019-05-21 LAB
ESTRADIOL SERPL-MCNC: 60 PG/ML
FSH SERPL-ACNC: 9.5 IU/L
LH SERPL-ACNC: 11 IU/L

## 2019-05-21 ASSESSMENT — ANXIETY QUESTIONNAIRES: GAD7 TOTAL SCORE: 5

## 2019-05-21 NOTE — TELEPHONE ENCOUNTER
"Requested Prescriptions   Pending Prescriptions Disp Refills     sertraline (ZOLOFT) 50 MG tablet [Pharmacy Med Name: SERTRALINE HCL 50MG TABS] 60 tablet 0     Sig: TAKE TWO TABLETS BY MOUTH ONCE DAILY       SSRIs Protocol Failed - 5/20/2019  6:04 PM        Failed - PHQ-9 score less than 5 in past 6 months     Please review last PHQ-9 score.           Passed - Medication is active on med list        Passed - Patient is age 18 or older        Passed - No active pregnancy on record        Passed - No positive pregnancy test in last 12 months        Passed - Recent (6 mo) or future (30 days) visit within the authorizing provider's specialty     Patient had office visit in the last 6 months or has a visit in the next 30 days with authorizing provider or within the authorizing provider's specialty.  See \"Patient Info\" tab in inbasket, or \"Choose Columns\" in Meds & Orders section of the refill encounter.            sertraline (ZOLOFT) 50 MG tablet  Last Written Prescription Date:  04/18/2019  Last Fill Quantity: 60 tablet,  # refills: 0   Last office visit: 5/20/2019 with prescribing provider:  STAN Paula   Future Office Visit:      PHQ-9 SCORE 6/4/2018 5/20/2019   PHQ-9 Total Score MyChart - 17 (Moderately severe depression)   PHQ-9 Total Score 8 17     HORACIO-7 SCORE 6/4/2018 5/20/2019   Total Score - 5 (mild anxiety)   Total Score 1 5       Gaye Montez RT (R) (M)    "

## 2019-05-21 NOTE — RESULT ENCOUNTER NOTE
Please Notify Ariadne  of test results lab levels do not indicate menopause.  Based on your current symptoms we will have you follow-up with OB/GYN.  Your provider has referred you to:  [facility]: [facility] [phone]       Gladys Paula CNP

## 2019-06-24 DIAGNOSIS — G43.909 MIGRAINE WITHOUT STATUS MIGRAINOSUS, NOT INTRACTABLE, UNSPECIFIED MIGRAINE TYPE: ICD-10-CM

## 2019-06-24 RX ORDER — SUMATRIPTAN 100 MG/1
100 TABLET, FILM COATED ORAL
Qty: 12 TABLET | Refills: 0 | Status: SHIPPED | OUTPATIENT
Start: 2019-06-24 | End: 2019-07-23

## 2019-06-24 NOTE — TELEPHONE ENCOUNTER
"Requested Prescriptions   Pending Prescriptions Disp Refills     SUMAtriptan (IMITREX) 100 MG tablet 12 tablet 2     Sig: Take 1 tablet (100 mg) by mouth at onset of headache for migraine May repeat in 2 hours. Max 2 tablets/24 hours.       Serotonin Agonists Failed - 6/24/2019  2:30 PM        Failed - Blood pressure under 140/90 in past 12 months     BP Readings from Last 3 Encounters:   05/20/19 142/80   07/17/18 126/68   07/02/18 110/62                 Failed - Serotonin Agonist request needs review.     Please review patient's record. If patient has had 8 or more treatments in the past month, please forward to provider.          Passed - Recent (12 mo) or future (30 days) visit within the authorizing provider's specialty     Patient had office visit in the last 12 months or has a visit in the next 30 days with authorizing provider or within the authorizing provider's specialty.  See \"Patient Info\" tab in inbasket, or \"Choose Columns\" in Meds & Orders section of the refill encounter.              Passed - Medication is active on med list        Passed - Patient is age 18 or older        Passed - No active pregnancy on record        Passed - No positive pregnancy test in past 12 months      SUMAtriptan (IMITREX) 100 MG tablet  Last Written Prescription Date:  03/18/2019  Last Fill Quantity: 12 tablet,  # refills: 2   Last office visit: 5/20/2019 with prescribing provider:  STAN Paula   Future Office Visit:      Gaye Montez RT (R) (M)      "

## 2019-07-12 ENCOUNTER — OFFICE VISIT (OUTPATIENT)
Dept: OBGYN | Facility: CLINIC | Age: 44
End: 2019-07-12
Payer: COMMERCIAL

## 2019-07-12 VITALS
HEART RATE: 111 BPM | HEIGHT: 67 IN | DIASTOLIC BLOOD PRESSURE: 87 MMHG | TEMPERATURE: 98.7 F | BODY MASS INDEX: 25.47 KG/M2 | SYSTOLIC BLOOD PRESSURE: 123 MMHG | RESPIRATION RATE: 18 BRPM | WEIGHT: 162.3 LBS

## 2019-07-12 DIAGNOSIS — N95.1 PERIMENOPAUSE: Primary | ICD-10-CM

## 2019-07-12 DIAGNOSIS — N93.8 DUB (DYSFUNCTIONAL UTERINE BLEEDING): ICD-10-CM

## 2019-07-12 DIAGNOSIS — Z12.4 SCREENING FOR MALIGNANT NEOPLASM OF CERVIX: ICD-10-CM

## 2019-07-12 PROCEDURE — 87624 HPV HI-RISK TYP POOLED RSLT: CPT | Performed by: OBSTETRICS & GYNECOLOGY

## 2019-07-12 PROCEDURE — 99203 OFFICE O/P NEW LOW 30 MIN: CPT | Performed by: OBSTETRICS & GYNECOLOGY

## 2019-07-12 PROCEDURE — G0145 SCR C/V CYTO,THINLAYER,RESCR: HCPCS | Performed by: OBSTETRICS & GYNECOLOGY

## 2019-07-12 RX ORDER — DIPHENHYDRAMINE HCL 50 MG
50 CAPSULE ORAL EVERY 6 HOURS PRN
COMMUNITY
End: 2021-03-05

## 2019-07-12 RX ORDER — LEVONORGESTREL/ETHIN.ESTRADIOL 0.1-0.02MG
1 TABLET ORAL DAILY
Qty: 84 TABLET | Refills: 3 | Status: SHIPPED | OUTPATIENT
Start: 2019-07-12 | End: 2021-03-05

## 2019-07-12 ASSESSMENT — MIFFLIN-ST. JEOR: SCORE: 1413.82

## 2019-07-12 NOTE — LETTER
July 22, 2019    Ariadne Lowry  [id number] [facility] [address]    Dear ,  This letter is regarding your recent Pap smear (cervical cancer screening) and Human Papillomavirus (HPV) test.  We are happy to inform you that your Pap smear result is normal. Cervical cancer is closely linked with certain types of HPV. Your results showed no evidence of high-risk HPV.  We recommend you have your next PAP smear and HPV test in 5 years.  You will still need to return to the clinic every year for an annual exam and other preventive tests.  If you have additional questions regarding this result, please call our registered nurse, Talia at [phone].  Sincerely,    Earnestine Burgos MD/demario

## 2019-07-12 NOTE — PROGRESS NOTES
Ariadne is a 44 year old No obstetric history on file.  female who presents for advice regarding change in period and other symptoms; about 9 months ago, menses started to become irregular, skipping at times, heavy at times; she also has severe moodiness, in spite of Sertraline 100mg, weight gain 20 lbs, not feeling herself at all  She has h/o BILATERAL TUBAL STERILIZATION; is not a smoker..    Patient Active Problem List    Diagnosis Date Noted     Adjustment disorder with depressed mood 04/12/2018     Priority: Medium     Bilateral carpal tunnel syndrome 03/07/2018     Priority: Medium     Noted on [facility] note dated 10/3/2016.  She received bilateral carpal tunnel injections on 10/3/2016.  In follow-up, an EMG was obtained and noted to be unremarkable.       Cervical radicular pain 09/18/2017     Priority: Medium     Migraine without status migrainosus, not intractable, unspecified migraine type 09/18/2017     Priority: Medium       All systems were reviewed and pertinent information in noted in subjective/HPI.    Past Medical History:   Diagnosis Date     Carpal tunnel syndrome     [facility].  Bilateral carpal tunnel injections on 10/3/2016       History reviewed. No pertinent surgical history.      Current Outpatient Medications:      diphenhydrAMINE (BENADRYL) 50 MG capsule, Take 50 mg by mouth every 6 hours as needed for itching or allergies, Disp: , Rfl:      levonorgestrel-ethinyl estradiol (AVIANE/ALESSE/LESSINA) 0.1-20 MG-MCG tablet, Take 1 tablet by mouth daily, Disp: 84 tablet, Rfl: 3     sertraline (ZOLOFT) 50 MG tablet, TAKE TWO TABLETS BY MOUTH ONCE DAILY, Disp: 60 tablet, Rfl: 2     SUMAtriptan (IMITREX) 100 MG tablet, Take 1 tablet (100 mg) by mouth at onset of headache for migraine May repeat in 2 hours. Max 2 tablets/24 hours., Disp: 12 tablet, Rfl: 0    ALLERGIES:  Patient has no known allergies.    Social History     Socioeconomic History     Marital status:       "Spouse name: None     Number of children: None     Years of education: None     Highest education level: None   Occupational History     None   Social Needs     Financial resource strain: None     Food insecurity:     Worry: None     Inability: None     Transportation needs:     Medical: None     Non-medical: None   Tobacco Use     Smoking status: Former Smoker     Last attempt to quit: 2004     Years since quittin.6     Smokeless tobacco: Never Used   Substance and Sexual Activity     Alcohol use: No     Drug use: No     Sexual activity: Yes     Partners: Male   Lifestyle     Physical activity:     Days per week: None     Minutes per session: None     Stress: None   Relationships     Social connections:     Talks on phone: None     Gets together: None     Attends Baptism service: None     Active member of club or organization: None     Attends meetings of clubs or organizations: None     Relationship status: None     Intimate partner violence:     Fear of current or ex partner: None     Emotionally abused: None     Physically abused: None     Forced sexual activity: None   Other Topics Concern     Parent/sibling w/ CABG, MI or angioplasty before 65F 55M? Not Asked   Social History Narrative     None       Family History   Problem Relation Age of Onset     Seasonal/Environmental Allergies Mother      Thyroid Disease Mother      Heart Disease Father      Neurologic Disorder Father      Heart Disease Maternal Grandfather      Alcoholism Paternal Grandfather      Depression Daughter      Psychotic Disorder Daughter        OBJECTIVE:  Vitals: /87   Pulse 111   Temp 98.7  F (37.1  C)   Resp 18   Ht 1.702 m (5' 7\")   Wt 73.6 kg (162 lb 4.8 oz)   LMP 2019   BMI 25.42 kg/m   BMI= Body mass index is 25.42 kg/m .   Patient's last menstrual period was 2019.     GENERAL APPEARANCE: healthy, alert and no distress  ABDOMEN:  soft, nontender, no hepato-splenomegaly or hernias   PELVIC:  EGBUS " normal, vagina well estrogenized and supported, no unusual discharge, cervix grossly normal, PAP taken, uterus normal in size and configuration, adnexa non-tender and not enlarged,   ASSESSMENT:      ICD-10-CM    1. Perimenopause N95.1    2. DUB (dysfunctional uterine bleeding) N93.8 levonorgestrel-ethinyl estradiol (AVIANE/ALESSE/LESSINA) 0.1-20 MG-MCG tablet   3. Screening for malignant neoplasm of cervix Z12.4        PLAN:  I discussed with Ariadne that perimenopause is generally starting with lack of ovulation, resulting in irregular menses and other triggers in the CNS that imbalance neurotransmitters.  We discussed use of low dose BCP to regulate her cycles, with periodic abstinence to check hormones  Also discussed use of cyclic progesterone or even mechanical destruction of lining with ablation or other serotonin specific reuptake inhibitor that can help with menoapuse like Venlafaxine and Paroxetine  She is amenable to trial of Low dose OCP at this time  Liliana Burgos MD  [facility]    Duration of visit:  30 minutes, >50% in discussion of current issues, treatment options and treatment planning.  LILIANA Burgos MD

## 2019-07-17 LAB
COPATH REPORT: NORMAL
PAP: NORMAL

## 2019-07-18 LAB
FINAL DIAGNOSIS: NORMAL
HPV HR 12 DNA CVX QL NAA+PROBE: NEGATIVE
HPV16 DNA SPEC QL NAA+PROBE: NEGATIVE
HPV18 DNA SPEC QL NAA+PROBE: NEGATIVE
SPECIMEN DESCRIPTION: NORMAL
SPECIMEN SOURCE CVX/VAG CYTO: NORMAL

## 2019-07-23 DIAGNOSIS — G43.909 MIGRAINE WITHOUT STATUS MIGRAINOSUS, NOT INTRACTABLE, UNSPECIFIED MIGRAINE TYPE: ICD-10-CM

## 2019-07-23 RX ORDER — SUMATRIPTAN 100 MG/1
100 TABLET, FILM COATED ORAL
Qty: 12 TABLET | Refills: 0 | Status: SHIPPED | OUTPATIENT
Start: 2019-07-23 | End: 2019-08-30

## 2019-07-23 NOTE — TELEPHONE ENCOUNTER
"Requested Prescriptions   Pending Prescriptions Disp Refills     SUMAtriptan (IMITREX) 100 MG tablet 12 tablet 0     Sig: Take 1 tablet (100 mg) by mouth at onset of headache for migraine May repeat in 2 hours. Max 2 tablets/24 hours.       Serotonin Agonists Failed - 7/23/2019  1:33 PM        Failed - Serotonin Agonist request needs review.     Please review patient's record. If patient has had 8 or more treatments in the past month, please forward to provider.          Passed - Blood pressure under 140/90 in past 12 months     BP Readings from Last 3 Encounters:   07/12/19 123/87   05/20/19 142/80   07/17/18 126/68                 Passed - Recent (12 mo) or future (30 days) visit within the authorizing provider's specialty     Patient had office visit in the last 12 months or has a visit in the next 30 days with authorizing provider or within the authorizing provider's specialty.  See \"Patient Info\" tab in inbasket, or \"Choose Columns\" in Meds & Orders section of the refill encounter.              Passed - Medication is active on med list        Passed - Patient is age 18 or older        Passed - No active pregnancy on record        Passed - No positive pregnancy test in past 12 months        Last Written Prescription Date:  6/24/19  Last Fill Quantity: 12,  # refills: 0   Last office visit: 5/20/2019 with prescribing provider:     Future Office Visit:      "

## 2019-08-23 DIAGNOSIS — F43.21 ADJUSTMENT DISORDER WITH DEPRESSED MOOD: ICD-10-CM

## 2019-08-23 NOTE — TELEPHONE ENCOUNTER
"Requested Prescriptions   Pending Prescriptions Disp Refills     sertraline (ZOLOFT) 50 MG tablet [Pharmacy Med Name: SERTRALINE HCL 50MG TABS] 60 tablet 2     Sig: TAKE TWO TABLETS BY MOUTH ONCE DAILY       SSRIs Protocol Failed - 8/23/2019 11:17 AM        Failed - PHQ-9 score less than 5 in past 6 months     Please review last PHQ-9 score.           Passed - Medication is active on med list        Passed - Patient is age 18 or older        Passed - No active pregnancy on record        Passed - No positive pregnancy test in last 12 months        Passed - Recent (6 mo) or future (30 days) visit within the authorizing provider's specialty     Patient had office visit in the last 6 months or has a visit in the next 30 days with authorizing provider or within the authorizing provider's specialty.  See \"Patient Info\" tab in inbasket, or \"Choose Columns\" in Meds & Orders section of the refill encounter.            sertraline (ZOLOFT) 50 MG tablet  Last Written Prescription Date:  05/21/2019  Last Fill Quantity: 60 tablet,  # refills: 2   Last office visit: 5/20/2019 with prescribing provider:  STAN Paula   Future Office Visit:      PHQ-9 SCORE 6/4/2018 5/20/2019   PHQ-9 Total Score MyChart - 17 (Moderately severe depression)   PHQ-9 Total Score 8 17     HORACIO-7 SCORE 6/4/2018 5/20/2019   Total Score - 5 (mild anxiety)   Total Score 1 5       Gaye Montez RT (R) (M)    "

## 2019-08-30 DIAGNOSIS — G43.909 MIGRAINE WITHOUT STATUS MIGRAINOSUS, NOT INTRACTABLE, UNSPECIFIED MIGRAINE TYPE: ICD-10-CM

## 2019-08-30 RX ORDER — SUMATRIPTAN 100 MG/1
100 TABLET, FILM COATED ORAL
Qty: 12 TABLET | Refills: 0 | Status: SHIPPED | OUTPATIENT
Start: 2019-08-30 | End: 2019-10-02

## 2019-08-30 NOTE — TELEPHONE ENCOUNTER
"Requested Prescriptions   Pending Prescriptions Disp Refills     SUMAtriptan (IMITREX) 100 MG tablet 12 tablet 0     Sig: Take 1 tablet (100 mg) by mouth at onset of headache for migraine May repeat in 2 hours. Max 2 tablets/24 hours.       Serotonin Agonists Failed - 8/30/2019  3:18 PM        Failed - Serotonin Agonist request needs review.     Please review patient's record. If patient has had 8 or more treatments in the past month, please forward to provider.          Passed - Blood pressure under 140/90 in past 12 months     BP Readings from Last 3 Encounters:   07/12/19 123/87   05/20/19 142/80   07/17/18 126/68                 Passed - Recent (12 mo) or future (30 days) visit within the authorizing provider's specialty     Patient had office visit in the last 12 months or has a visit in the next 30 days with authorizing provider or within the authorizing provider's specialty.  See \"Patient Info\" tab in inbasket, or \"Choose Columns\" in Meds & Orders section of the refill encounter.              Passed - Medication is active on med list        Passed - Patient is age 18 or older        Passed - No active pregnancy on record        Passed - No positive pregnancy test in past 12 months        SUMAtriptan (IMITREX) 100 MG tablet  Last Written Prescription Date:  07/23/2019  Last Fill Quantity: 12 tablet,  # refills: 0   Last office visit: 5/20/2019 with prescribing provider:  STAN Paula   Future Office Visit:      Gaye Montez RT (R) (M)    "

## 2019-09-27 DIAGNOSIS — G43.909 MIGRAINE WITHOUT STATUS MIGRAINOSUS, NOT INTRACTABLE, UNSPECIFIED MIGRAINE TYPE: ICD-10-CM

## 2019-09-28 NOTE — TELEPHONE ENCOUNTER
"Requested Prescriptions   Pending Prescriptions Disp Refills     SUMAtriptan (IMITREX) 100 MG tablet 12 tablet 0     Sig: Take 1 tablet (100 mg) by mouth at onset of headache for migraine May repeat in 2 hours. Max 2 tablets/24 hours.       Serotonin Agonists Failed - 9/27/2019  6:14 PM        Failed - Serotonin Agonist request needs review.     Please review patient's record. If patient has had 8 or more treatments in the past month, please forward to provider.          Passed - Blood pressure under 140/90 in past 12 months     BP Readings from Last 3 Encounters:   07/12/19 123/87   05/20/19 142/80   07/17/18 126/68                 Passed - Recent (12 mo) or future (30 days) visit within the authorizing provider's specialty     Patient has had an office visit with the authorizing provider or a provider within the authorizing providers department within the previous 12 mos or has a future within next 30 days. See \"Patient Info\" tab in inbasket, or \"Choose Columns\" in Meds & Orders section of the refill encounter.              Passed - Medication is active on med list        Passed - Patient is age 18 or older        Passed - No active pregnancy on record        Passed - No positive pregnancy test in past 12 months        Last Written Prescription Date:  8/23/19  Last Fill Quantity: 60,  # refills: 2   Last office visit: 5/20/2019 with prescribing provider:     Future Office Visit:      "

## 2019-09-30 NOTE — TELEPHONE ENCOUNTER
Called pt to see how frequent she has needed to use Imitrex since last ordered on 8/30/19. I have attempted to contact this patient by phone with the following results: no answer, The customer is not available, please try your call again later.' Unable to leave message.    Diya SMITH RN

## 2019-10-02 RX ORDER — SUMATRIPTAN 100 MG/1
100 TABLET, FILM COATED ORAL
Qty: 12 TABLET | Refills: 0 | Status: SHIPPED | OUTPATIENT
Start: 2019-10-02 | End: 2019-11-22

## 2019-10-19 ENCOUNTER — HOSPITAL ENCOUNTER (EMERGENCY)
Facility: HOSPITAL | Age: 44
Discharge: HOME | End: 2019-10-19
Attending: EMERGENCY MEDICINE
Payer: COMMERCIAL

## 2019-10-19 ENCOUNTER — OFFICE VISIT (OUTPATIENT)
Dept: URGENT CARE | Facility: URGENT CARE | Age: 44
End: 2019-10-19
Payer: COMMERCIAL

## 2019-10-19 VITALS
WEIGHT: 160.72 LBS | DIASTOLIC BLOOD PRESSURE: 93 MMHG | RESPIRATION RATE: 20 BRPM | TEMPERATURE: 97.5 F | SYSTOLIC BLOOD PRESSURE: 142 MMHG | HEART RATE: 96 BPM | BODY MASS INDEX: 25.17 KG/M2 | OXYGEN SATURATION: 98 %

## 2019-10-19 VITALS
TEMPERATURE: 98.4 F | HEIGHT: 67 IN | OXYGEN SATURATION: 98 % | DIASTOLIC BLOOD PRESSURE: 64 MMHG | HEART RATE: 94 BPM | BODY MASS INDEX: 24.96 KG/M2 | RESPIRATION RATE: 20 BRPM | SYSTOLIC BLOOD PRESSURE: 128 MMHG | WEIGHT: 159 LBS

## 2019-10-19 DIAGNOSIS — I89.1 LYMPHANGITIS: ICD-10-CM
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
## 2019-10-19 PROCEDURE — 99202 OFFICE O/P NEW SF 15 MIN: CPT | Mod: NCNR | Performed by: PHYSICIAN ASSISTANT

## 2019-10-19 PROCEDURE — 99283 EMERGENCY DEPT VISIT LOW MDM: CPT | Performed by: EMERGENCY MEDICINE

## 2019-10-19 RX ORDER — DIAPER,BRIEF,INFANT-TODD,DISP
EACH MISCELLANEOUS 2 TIMES DAILY
COMMUNITY

## 2019-10-19 RX ORDER — CEFADROXIL 500 MG/1
500 CAPSULE ORAL 2 TIMES DAILY
Qty: 20 CAPSULE | Refills: 0 | Status: SHIPPED | OUTPATIENT
Start: 2019-10-19 | End: 2019-10-29

## 2019-10-19 RX ORDER — SUMATRIPTAN SUCCINATE 100 MG/1
TABLET ORAL
COMMUNITY
Start: 2019-10-02

## 2019-10-19 SDOH — HEALTH STABILITY: MENTAL HEALTH: HOW OFTEN DO YOU HAVE A DRINK CONTAINING ALCOHOL?: NEVER

## 2019-10-19 ASSESSMENT — PAIN SCALES - GENERAL: PAINLEVEL: 4

## 2019-10-19 NOTE — DISCHARGE INSTRUCTIONS
I cannot be positive what caused the bites but it does look to be insect bite.  You can try some hydroxyzine or Benadryl for the itching.  Some baking soda compresses may help with the itching.  Try your best not to scratch.  Do not start the antibiotic unless you worsen over the next 24 to 48 hours.  I suspect this is more a normal body's reaction to the insect bites and should improve without any particular treatment.

## 2019-10-19 NOTE — PROGRESS NOTES
"Clinic Note     SUBJECTIVE    Chief Complaint:  Chief Complaint   Patient presents with   • Insect Bite     yesterday AM first noticed, very itchy, no bugs seen, staying at motel       HPI:    Juliet BARRERA is a 44 y.o. female who presents for Insect Bite (yesterday AM first noticed, very itchy, no bugs seen, staying at motel)  .     The following have been reviewed and updated as appropriate in this visit:         No Known Allergies  Current Outpatient Medications   Medication Sig Dispense Refill   • diphenhydramine HCl/zinc acet (BENADRYL EXTRA STRENGTH T) Apply topically     • hydrocortisone 0.5 % cream Apply topically 2 (two) times a day     • SUMAtriptan (IMITREX) 100 mg tablet        No current facility-administered medications for this visit.      No past medical history on file.  No past surgical history on file.  No family history on file.  Social History     Occupational History   • Not on file   Tobacco Use   • Smoking status: Former Smoker     Packs/day: 1.00     Types: Cigarettes     Start date: 1990     Last attempt to quit: 2002     Years since quittin.8   • Smokeless tobacco: Never Used   Substance and Sexual Activity   • Alcohol use: Not on file   • Drug use: Not on file   • Sexual activity: Not on file   Social History Narrative   • Not on file       Review of Systems:  Review of Systems    OBJECTIVE     Vitals:  /64 (BP Location: Right arm, Patient Position: Sitting, Cuff Size: Reg)   Pulse 94   Temp 36.9 °C (98.4 °F)   Resp 20   Ht 1.702 m (5' 7\")   Wt 72.1 kg (159 lb)   LMP 2019 (Approximate)   SpO2 98%   Breastfeeding? No   BMI 24.90 kg/m²     PE:  Physical Exam  Vitals signs and nursing note reviewed.   Constitutional:       Appearance: She is well-developed.   HENT:      Head: Normocephalic and atraumatic.   Eyes:      Pupils: Pupils are equal, round, and reactive to light.   Cardiovascular:      Rate and Rhythm: Normal rate and regular rhythm.   Pulmonary: "      Effort: Pulmonary effort is normal.   Skin:     Findings: Erythema (No erythema surrounding several puncture marks/bug bites on her upper extremities and neck region.  She had approximately 4 cm red streaking going up the lateral left distal radial region from a lesion on the left thenar eminence.) present.           No results found for this or any previous visit (from the past 12 hour(s)).      ASSESSMENT:    Juliet was seen today for insect bite.    Diagnoses and all orders for this visit:    Bug bite, initial encounter    Lymphangitis          PLAN  Patient with red streaking from a wound on her left thenar eminence.  Vitals stable, she felt fine, but was transferred to ED for higher level of care.  Her  drove her.  Transfer center notified.  Dr. Abrams, physician on-call, consulted.  Dr. Argueta is the accepting physician at the ED.    JEANINE Thorne

## 2019-10-20 ASSESSMENT — ENCOUNTER SYMPTOMS
EYE PAIN: 0
RHINORRHEA: 0
WEAKNESS: 0
DYSPHORIC MOOD: 0
MYALGIAS: 0
COUGH: 0
HEADACHES: 0
BACK PAIN: 0
SHORTNESS OF BREATH: 0
BLOOD IN STOOL: 0
DIFFICULTY URINATING: 0
DIARRHEA: 0
DIZZINESS: 0
ABDOMINAL PAIN: 0
SORE THROAT: 0
ADENOPATHY: 0
VOMITING: 0
FEVER: 0
FLANK PAIN: 0

## 2019-10-21 NOTE — ED PROVIDER NOTES
"    HPI:  Chief Complaint   Patient presents with   • Arm Pain     Left arm pain since yesterday mornings, redness, swelling at multiple sites on left arm and one on upper left back.  \"I had bug bites and started scratching them.\"     Patient is referred here from local urgent care.  She states she has been staying at the days in the last 2 nights.  During the middle the night she started having multiple insect bites.  She did not see any definite insects.  They are on her back in both arms some on her legs.  She is noted to have a red streak on the medial wrist when she was at the urgent care.  It is pruritic but not very painful.  Her  use shares the same bed did not have any rash develop.  HPI    HISTORY:  History reviewed. No pertinent past medical history.    History reviewed. No pertinent surgical history.    History reviewed. No pertinent family history.    Social History     Tobacco Use   • Smoking status: Former Smoker     Packs/day: 1.00     Types: Cigarettes     Start date: 1990     Last attempt to quit: 2002     Years since quittin.8   • Smokeless tobacco: Never Used   Substance Use Topics   • Alcohol use: Never     Frequency: Never   • Drug use: Never         ROS:  Review of Systems   Constitutional: Negative for fever.   HENT: Negative for congestion, rhinorrhea and sore throat.    Eyes: Negative for pain.   Respiratory: Negative for cough and shortness of breath.    Cardiovascular: Negative for chest pain.   Gastrointestinal: Negative for abdominal pain, blood in stool, diarrhea and vomiting.   Genitourinary: Negative for difficulty urinating and flank pain.   Musculoskeletal: Negative for back pain and myalgias.   Skin: Positive for rash.   Allergic/Immunologic: Negative for immunocompromised state.   Neurological: Negative for dizziness, weakness and headaches.   Hematological: Negative for adenopathy.   Psychiatric/Behavioral: Negative for dysphoric mood.       PE:  ED Triage " Vitals [10/19/19 1227]   Temp Heart Rate Resp BP SpO2   36.4 °C (97.5 °F) 96 20 142/93 98 %      Temp Source Heart Rate Source Patient Position BP Location FiO2 (%)   Oral -- -- -- --       Physical Exam  Vitals signs and nursing note reviewed.   Constitutional:       General: She is not in acute distress.     Appearance: She is well-developed.   HENT:      Head: Normocephalic and atraumatic.      Right Ear: External ear normal.      Left Ear: External ear normal.   Eyes:      Conjunctiva/sclera: Conjunctivae normal.   Neck:      Musculoskeletal: Normal range of motion and neck supple.   Cardiovascular:      Rate and Rhythm: Normal rate and regular rhythm.      Heart sounds: Normal heart sounds. No murmur.   Pulmonary:      Effort: Pulmonary effort is normal. No respiratory distress.      Breath sounds: Normal breath sounds.   Abdominal:      General: There is no distension.      Palpations: Abdomen is soft.      Tenderness: There is no tenderness.   Musculoskeletal:         General: No tenderness.   Skin:     General: Skin is warm and dry.      Findings: Rash present.      Comments: Multiple papules are present on the back and arms and do appear consistent with insect bites or stings.  There is slight excoriation on some of these papules.  There is a small area of lymphangitic streaking on the left wrist related to 1 of these papules.  No evidence of cellulitis or secondary infection otherwise.   Neurological:      General: No focal deficit present.      Mental Status: She is alert and oriented to person, place, and time.   Psychiatric:         Mood and Affect: Mood normal.         ED LABS:  Labs Reviewed - No data to display      ED IMAGES:  No orders to display       ED PROCEDURES:  Procedures    ED COURSE:     I do believe that these lesions are related to insect bites.  Given the multiplicity of lesions bedbugs would be within the differential.  No exposure to mosquitoes and it is unlikely that a spider would  bite her multiple times.  She is given prescription for Duricef if this should worsen.  At this point I do not think this is infected and I encouraged her to wait before filling the prescription.  I recommended she use Benadryl or similar for itching if needed.       MDM:  MDM    Final diagnoses:   [W57.XXXA] Insect bites       A voice recognition program was used to aid in documentation of this record. Sometimes words are not printed exactly as they were spoken.  While efforts were made to carefully edit and correct any inaccuracies, some errors may be present; please take these into context.  Please contact the provider if errors are identified.        Abby Hall MD  10/20/19 1941

## 2019-11-21 DIAGNOSIS — G43.909 MIGRAINE WITHOUT STATUS MIGRAINOSUS, NOT INTRACTABLE, UNSPECIFIED MIGRAINE TYPE: ICD-10-CM

## 2019-11-21 NOTE — TELEPHONE ENCOUNTER
"Requested Prescriptions   Pending Prescriptions Disp Refills     SUMAtriptan (IMITREX) 100 MG tablet 12 tablet 0     Sig: Take 1 tablet (100 mg) by mouth at onset of headache for migraine May repeat in 2 hours. Max 2 tablets/24 hours.       Serotonin Agonists Failed - 11/21/2019  3:42 PM        Failed - Serotonin Agonist request needs review.     Please review patient's record. If patient has had 8 or more treatments in the past month, please forward to provider.          Passed - Blood pressure under 140/90 in past 12 months     BP Readings from Last 3 Encounters:   07/12/19 123/87   05/20/19 142/80   07/17/18 126/68                 Passed - Recent (12 mo) or future (30 days) visit within the authorizing provider's specialty     Patient has had an office visit with the authorizing provider or a provider within the authorizing providers department within the previous 12 mos or has a future within next 30 days. See \"Patient Info\" tab in inbasket, or \"Choose Columns\" in Meds & Orders section of the refill encounter.              Passed - Medication is active on med list        Passed - Patient is age 18 or older        Passed - No active pregnancy on record        Passed - No positive pregnancy test in past 12 months      SUMAtriptan (IMITREX) 100 MG tablet  Last Written Prescription Date:  10/02/2019  Last Fill Quantity: 12 tablet,  # refills: 0   Last office visit: 5/20/2019 with prescribing provider:  STAN Paula   Future Office Visit:      Gaye PANDYA (R) (M)      "

## 2019-11-22 RX ORDER — SUMATRIPTAN 100 MG/1
100 TABLET, FILM COATED ORAL
Qty: 12 TABLET | Refills: 11 | Status: SHIPPED | OUTPATIENT
Start: 2019-11-22 | End: 2020-12-24

## 2019-11-22 NOTE — TELEPHONE ENCOUNTER
Dr. Hand,  Routing refill request to provider for review/approval because:  Patient called and states she uses full script of 12 tablets in past month, RN protocol states to forward to provider if uses greater than 8 tablets, patient states she is out and needs today, please advise in PCP absence, thank you.    AGGIE Muse

## 2020-03-02 ENCOUNTER — HEALTH MAINTENANCE LETTER (OUTPATIENT)
Age: 45
End: 2020-03-02

## 2020-12-20 ENCOUNTER — HEALTH MAINTENANCE LETTER (OUTPATIENT)
Age: 45
End: 2020-12-20

## 2020-12-21 DIAGNOSIS — G43.909 MIGRAINE WITHOUT STATUS MIGRAINOSUS, NOT INTRACTABLE, UNSPECIFIED MIGRAINE TYPE: ICD-10-CM

## 2020-12-22 NOTE — TELEPHONE ENCOUNTER
Left message for patient to return our call. She is due for a follow-up visit for this request. CSS ok to give message. Thank you!    Mona Hoyos RN

## 2020-12-24 RX ORDER — SUMATRIPTAN 100 MG/1
TABLET, FILM COATED ORAL
Qty: 12 TABLET | Refills: 0 | Status: SHIPPED | OUTPATIENT
Start: 2020-12-24 | End: 2021-01-29

## 2021-01-28 DIAGNOSIS — G43.909 MIGRAINE WITHOUT STATUS MIGRAINOSUS, NOT INTRACTABLE, UNSPECIFIED MIGRAINE TYPE: ICD-10-CM

## 2021-01-29 RX ORDER — SUMATRIPTAN 100 MG/1
TABLET, FILM COATED ORAL
Qty: 6 TABLET | Refills: 0 | Status: SHIPPED | OUTPATIENT
Start: 2021-01-29 | End: 2021-03-05

## 2021-02-28 ENCOUNTER — HEALTH MAINTENANCE LETTER (OUTPATIENT)
Age: 46
End: 2021-02-28

## 2021-03-05 ENCOUNTER — OFFICE VISIT (OUTPATIENT)
Dept: FAMILY MEDICINE | Facility: CLINIC | Age: 46
End: 2021-03-05
Payer: COMMERCIAL

## 2021-03-05 VITALS
BODY MASS INDEX: 24.96 KG/M2 | RESPIRATION RATE: 18 BRPM | DIASTOLIC BLOOD PRESSURE: 68 MMHG | TEMPERATURE: 99.1 F | SYSTOLIC BLOOD PRESSURE: 122 MMHG | OXYGEN SATURATION: 96 % | HEART RATE: 96 BPM | HEIGHT: 67 IN | WEIGHT: 159 LBS

## 2021-03-05 DIAGNOSIS — G43.909 MIGRAINE WITHOUT STATUS MIGRAINOSUS, NOT INTRACTABLE, UNSPECIFIED MIGRAINE TYPE: ICD-10-CM

## 2021-03-05 PROCEDURE — 99213 OFFICE O/P EST LOW 20 MIN: CPT | Performed by: FAMILY MEDICINE

## 2021-03-05 RX ORDER — SUMATRIPTAN 100 MG/1
TABLET, FILM COATED ORAL
Qty: 12 TABLET | Refills: 11 | Status: SHIPPED | OUTPATIENT
Start: 2021-03-05 | End: 2022-03-22

## 2021-03-05 RX ORDER — AMITRIPTYLINE HYDROCHLORIDE 10 MG/1
10 TABLET ORAL AT BEDTIME
Qty: 30 TABLET | Refills: 1 | Status: SHIPPED | OUTPATIENT
Start: 2021-03-05 | End: 2021-04-06

## 2021-03-05 ASSESSMENT — PATIENT HEALTH QUESTIONNAIRE - PHQ9
10. IF YOU CHECKED OFF ANY PROBLEMS, HOW DIFFICULT HAVE THESE PROBLEMS MADE IT FOR YOU TO DO YOUR WORK, TAKE CARE OF THINGS AT HOME, OR GET ALONG WITH OTHER PEOPLE: NOT DIFFICULT AT ALL
SUM OF ALL RESPONSES TO PHQ QUESTIONS 1-9: 8
SUM OF ALL RESPONSES TO PHQ QUESTIONS 1-9: 8

## 2021-03-05 ASSESSMENT — ANXIETY QUESTIONNAIRES
7. FEELING AFRAID AS IF SOMETHING AWFUL MIGHT HAPPEN: NOT AT ALL
GAD7 TOTAL SCORE: 3
3. WORRYING TOO MUCH ABOUT DIFFERENT THINGS: NOT AT ALL
1. FEELING NERVOUS, ANXIOUS, OR ON EDGE: NOT AT ALL
2. NOT BEING ABLE TO STOP OR CONTROL WORRYING: SEVERAL DAYS
7. FEELING AFRAID AS IF SOMETHING AWFUL MIGHT HAPPEN: NOT AT ALL
6. BECOMING EASILY ANNOYED OR IRRITABLE: SEVERAL DAYS
GAD7 TOTAL SCORE: 3
5. BEING SO RESTLESS THAT IT IS HARD TO SIT STILL: NOT AT ALL
4. TROUBLE RELAXING: SEVERAL DAYS
GAD7 TOTAL SCORE: 3

## 2021-03-05 ASSESSMENT — MIFFLIN-ST. JEOR: SCORE: 1393.85

## 2021-03-05 NOTE — PATIENT INSTRUCTIONS
ASSESSMENT:   (G43.909) Migraine without status migrainosus, not intractable, unspecified migraine type  Comment: intermittent.  sumatriptan works well.  Has not been on preventative medication.   Plan: SUMAtriptan (IMITREX) 100 MG tablet,         amitriptyline (ELAVIL) 10 MG tablet          Refills on sumatriptan to use at onset of migraine.  May repeat in two hours if needed.  Maximum two pills in a day.   Try amitriptyline 10mg at bedtime.  Take on a daily basis for migraine prevention.  It can help for sleep and chronic pain.  If not helpful in the next month-increase to two pills at bedtime.  There is room to increase dose if needed.    Let me know if side effects, there are other medications we can try.

## 2021-03-06 ASSESSMENT — ANXIETY QUESTIONNAIRES: GAD7 TOTAL SCORE: 3

## 2021-04-06 ENCOUNTER — TELEPHONE (OUTPATIENT)
Dept: FAMILY MEDICINE | Facility: CLINIC | Age: 46
End: 2021-04-06

## 2021-04-06 DIAGNOSIS — G43.909 MIGRAINE WITHOUT STATUS MIGRAINOSUS, NOT INTRACTABLE, UNSPECIFIED MIGRAINE TYPE: ICD-10-CM

## 2021-04-06 RX ORDER — AMITRIPTYLINE HYDROCHLORIDE 10 MG/1
20 TABLET ORAL AT BEDTIME
Qty: 60 TABLET | Refills: 1 | Status: SHIPPED | OUTPATIENT
Start: 2021-04-06 | End: 2022-02-18

## 2021-04-06 NOTE — TELEPHONE ENCOUNTER
Per 03-05-21 OV-  Refills on sumatriptan to use at onset of migraine.  May repeat in two hours if needed.  Maximum two pills in a day.   Try amitriptyline 10mg at bedtime.  Take on a daily basis for migraine prevention.  It can help for sleep and chronic pain.  If not helpful in the next month-increase to two pills at bedtime.  There is room to increase dose if needed.    Let me know if side effects, there are other medications we can try.      Forwarded to Dr. Hand.  JUAN Henderson, RN

## 2021-04-06 NOTE — TELEPHONE ENCOUNTER
Reason for Call:  Other prescription    Detailed comments: Pt was seen with Dr. Hand on 3/5/21 and for the medication of amitriptyline she would like a increase due to her migraines and the medication not working.  Phar is FLORENCE MORLEY    Phone Number Patient can be reached at: Home number on file [phone] (home)    Best Time: any    Can we leave a detailed message on this number? YES    Call taken on 4/6/2021 at 11:42 AM by Yanely Gupta

## 2021-04-06 NOTE — TELEPHONE ENCOUNTER
Please call.   I recommend increase to 20mg (two pills) at bedtime.   If not doing better in 3-4 weeks and no significant side effects, she can increase further to 30mg at bedtime.   I sent prescription for the higher dose.   BALJIT MARIE MD

## 2021-04-24 ENCOUNTER — HEALTH MAINTENANCE LETTER (OUTPATIENT)
Age: 46
End: 2021-04-24

## 2021-10-03 ENCOUNTER — HEALTH MAINTENANCE LETTER (OUTPATIENT)
Age: 46
End: 2021-10-03

## 2021-12-23 ENCOUNTER — ANCILLARY PROCEDURE (OUTPATIENT)
Dept: GENERAL RADIOLOGY | Facility: CLINIC | Age: 46
End: 2021-12-23
Attending: FAMILY MEDICINE
Payer: COMMERCIAL

## 2021-12-23 ENCOUNTER — OFFICE VISIT (OUTPATIENT)
Dept: URGENT CARE | Facility: URGENT CARE | Age: 46
End: 2021-12-23
Payer: COMMERCIAL

## 2021-12-23 VITALS
BODY MASS INDEX: 24.81 KG/M2 | TEMPERATURE: 97.9 F | SYSTOLIC BLOOD PRESSURE: 130 MMHG | DIASTOLIC BLOOD PRESSURE: 82 MMHG | WEIGHT: 158.4 LBS | OXYGEN SATURATION: 99 % | HEART RATE: 78 BPM
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
## 2021-12-23 PROCEDURE — 73070 X-RAY EXAM OF ELBOW: CPT | Mod: RT | Performed by: RADIOLOGY

## 2021-12-23 PROCEDURE — 99213 OFFICE O/P EST LOW 20 MIN: CPT | Mod: 25 | Performed by: FAMILY MEDICINE

## 2021-12-23 PROCEDURE — 73090 X-RAY EXAM OF FOREARM: CPT | Mod: RT | Performed by: RADIOLOGY

## 2021-12-23 PROCEDURE — 96372 THER/PROPH/DIAG INJ SC/IM: CPT | Performed by: FAMILY MEDICINE

## 2021-12-23 PROCEDURE — 73030 X-RAY EXAM OF SHOULDER: CPT | Mod: RT | Performed by: RADIOLOGY

## 2021-12-23 RX ORDER — KETOROLAC TROMETHAMINE 30 MG/ML
60 INJECTION, SOLUTION INTRAMUSCULAR; INTRAVENOUS ONCE
Status: COMPLETED | OUTPATIENT
Start: 2021-12-23 | End: 2021-12-23

## 2021-12-23 RX ADMIN — KETOROLAC TROMETHAMINE 60 MG: 30 INJECTION, SOLUTION INTRAMUSCULAR; INTRAVENOUS at 17:41

## 2021-12-23 NOTE — PROGRESS NOTES
S: Very pleasant 46-year-old female presents after fall on her right shoulder while walking on the ice earlier this afternoon.  ROS: Negative for loss of consciousness.  Negative for paresthesias distally.    Meds: Elavil and sumatriptan as needed    O: Blood pressure 130/82, temperature 97.9, pulse 78  Patient alert conversant and in moderate distress  Examination of the thorax shows both shoulders symmetric.  There is pain to palpation over the head of the humerus and approximately 10 cm distal to that.  Normal flexion extension of the elbow.  Normal wrist flexion extension.  No ecchymosis.    X-ray: Pulmonary read shows no evidence of fracture.  Overread pending.    A: Fall    P: Possible rotator cuff involvement.  We will give the patient a sling for comfort, continue over-the-counter analgesics, and close clinical follow-up on Monday, 12/27/2021.

## 2022-02-18 ENCOUNTER — OFFICE VISIT (OUTPATIENT)
Dept: FAMILY MEDICINE | Facility: CLINIC | Age: 47
End: 2022-02-18
Payer: COMMERCIAL

## 2022-02-18 VITALS
RESPIRATION RATE: 18 BRPM | TEMPERATURE: 98.3 F | SYSTOLIC BLOOD PRESSURE: 134 MMHG | WEIGHT: 160 LBS | DIASTOLIC BLOOD PRESSURE: 80 MMHG | BODY MASS INDEX: 25.11 KG/M2 | HEART RATE: 68 BPM | HEIGHT: 67 IN

## 2022-02-18 DIAGNOSIS — S42.254G CLOSED NONDISPLACED FRACTURE OF GREATER TUBEROSITY OF RIGHT HUMERUS WITH DELAYED HEALING, SUBSEQUENT ENCOUNTER: Primary | ICD-10-CM

## 2022-02-18 DIAGNOSIS — S46.001D INJURY OF RIGHT ROTATOR CUFF, SUBSEQUENT ENCOUNTER: ICD-10-CM

## 2022-02-18 PROCEDURE — 99214 OFFICE O/P EST MOD 30 MIN: CPT | Performed by: NURSE PRACTITIONER

## 2022-02-18 RX ORDER — HYDROCODONE BITARTRATE AND ACETAMINOPHEN 5; 325 MG/1; MG/1
1 TABLET ORAL
Qty: 10 TABLET | Refills: 0 | Status: SHIPPED | OUTPATIENT
Start: 2022-02-18 | End: 2022-02-21

## 2022-02-18 ASSESSMENT — PATIENT HEALTH QUESTIONNAIRE - PHQ9
10. IF YOU CHECKED OFF ANY PROBLEMS, HOW DIFFICULT HAVE THESE PROBLEMS MADE IT FOR YOU TO DO YOUR WORK, TAKE CARE OF THINGS AT HOME, OR GET ALONG WITH OTHER PEOPLE: SOMEWHAT DIFFICULT
SUM OF ALL RESPONSES TO PHQ QUESTIONS 1-9: 13
SUM OF ALL RESPONSES TO PHQ QUESTIONS 1-9: 13

## 2022-02-18 ASSESSMENT — PAIN SCALES - GENERAL: PAINLEVEL: SEVERE PAIN (6)

## 2022-02-18 NOTE — PROGRESS NOTES
"  Assessment & Plan     (S42.254G) Closed nondisplaced fracture of greater tuberosity of right humerus with delayed healing, subsequent encounter  (primary encounter diagnosis)  Comment: X-ray negative from a month ago patient continues to have pain will obtain MRI.  MRI did show fracture would recommend patient be seen by orthopedics and wear sling until she is able to be seen by orthopedics this week  Plan: Orthopedic  Referral      (S46.001D) Injury of right rotator cuff, subsequent encounter  Comment:   Plan: MR Shoulder Right w/o Contrast,         HYDROcodone-acetaminophen (NORCO) 5-325 MG         tablet            BMI:   Estimated body mass index is 25.06 kg/m  as calculated from the following:    Height as of this encounter: 1.702 m (5' 7\").    Weight as of this encounter: 72.6 kg (160 lb).   Weight management plan: Discussed healthy diet and exercise guidelines    Depression Screening Follow Up    PHQ 2/18/2022   PHQ-9 Total Score 13   Q9: Thoughts of better off dead/self-harm past 2 weeks Not at all   F/U: Thoughts of suicide or self-harm -   F/U: Safety concerns -     Last PHQ-9 2/18/2022   1.  Little interest or pleasure in doing things 2   2.  Feeling down, depressed, or hopeless 2   3.  Trouble falling or staying asleep, or sleeping too much 3   4.  Feeling tired or having little energy 2   5.  Poor appetite or overeating 1   6.  Feeling bad about yourself 2   7.  Trouble concentrating 1   8.  Moving slowly or restless 0   Q9: Thoughts of better off dead/self-harm past 2 weeks 0   PHQ-9 Total Score 13   In the past two weeks have you had thoughts of suicide or self harm? -   Do you have concerns about your personal safety or the safety of others? -       Follow Up Actions Taken  Crisis resource information provided in After Visit Summary     See Patient Instructions    No follow-ups on file.    KATHRYN Chapman CNP  [facility]    Mitul Ron is a 46 year " "old who presents for the following health issues     History of Present Illness     Reason for visit:  Right shoulder injury; pain  Symptom onset:  More than a month  Symptoms include:  Pain  Symptom intensity:  Moderate  Symptom progression:  Worsening  Had these symptoms before:  Yes  Has tried/received treatment for these symptoms:  Yes  Previous treatment was successful:  No  What makes it worse:  Increased activity increases my pain  What makes it better:  Somewhat decreases pain temporarily to receive chiropractic care    She eats 2-3 servings of fruits and vegetables daily.She consumes 0 sweetened beverage(s) daily.She exercises with enough effort to increase her heart rate 20 to 29 minutes per day.  She exercises with enough effort to increase her heart rate 4 days per week.   She is taking medications regularly.       Shoulder Pain    Onset: 6 weeks    Description:   Location: right shoulder  Character: Sharp and Dull ache    Intensity: 6/10    Progression of Symptoms: same    Accompanying Signs & Symptoms:  Other symptoms: none    History:   Previous similar pain: no       Precipitating factors:   Trauma or overuse: YES- fell    Alleviating factors:  Improved by: nothing    Therapies Tried and outcome: ibuprofen, ice          Review of Systems   Constitutional, HEENT, cardiovascular, pulmonary, gi and gu systems are negative, except as otherwise noted.      Objective    /80   Pulse 68   Temp 98.3  F (36.8  C) (Tympanic)   Resp 18   Ht 1.702 m (5' 7\")   Wt 72.6 kg (160 lb)   BMI 25.06 kg/m    There is no height or weight on file to calculate BMI.  Physical Exam   GENERAL: healthy, alert and no distress  EYES: Eyes grossly normal to inspection, PERRL and conjunctivae and sclerae normal  HENT: ear canals and TM's normal, nose and mouth without ulcers or lesions  NECK: no adenopathy, no asymmetry, masses, or scars and thyroid normal to palpation  RESP: lungs clear to auscultation - no rales, " rhonchi or wheezes  CV: regular rate and rhythm, normal S1 S2, no S3 or S4, no murmur, click or rub, no peripheral edema and peripheral pulses strong  MS: no gross musculoskeletal defects noted, no edema  SKIN: no suspicious lesions or rashes  NEURO: Normal strength and tone, mentation intact and speech normal  PSYCH: mentation appears normal, affect normal/bright    Palpation:  Tender  AC joint, acromion,Non-tender SC joint, clavicle, subacromial space, proximal bicep tendon and upper trapezius muscle   Range of Motion        Active:limited       Passive:limited  all normal  Strength: rotator cuff strength weak and limited ROM   Special tests: Positive Negative Positive Neer's test, Negative Gardner, Negative Cross-Arm adduction, Negative Anterior Apprehension, Negative Posterior Apprehension, Negative Sulcus Sign, Negative Logan's    No results found for any visits on 02/18/22.

## 2022-02-20 ENCOUNTER — HOSPITAL ENCOUNTER (OUTPATIENT)
Dept: MRI IMAGING | Facility: CLINIC | Age: 47
Discharge: HOME | End: 2022-02-20
Attending: NURSE PRACTITIONER | Admitting: NURSE PRACTITIONER
Payer: COMMERCIAL

## 2022-02-20 DIAGNOSIS — S46.001D INJURY OF RIGHT ROTATOR CUFF, SUBSEQUENT ENCOUNTER: ICD-10-CM

## 2022-02-20 PROCEDURE — 73221 MRI JOINT UPR EXTREM W/O DYE: CPT | Mod: RT

## 2022-02-20 PROCEDURE — 73221 MRI JOINT UPR EXTREM W/O DYE: CPT | Mod: 26 | Performed by: RADIOLOGY

## 2022-02-22 NOTE — PROGRESS NOTES
ASSESSMENT & PLAN    Ariadne was seen today for pain.    Diagnoses and all orders for this visit:    Injury of right shoulder, initial encounter  -     XR Shoulder Right G/E 3 Views; Future  -     cyclobenzaprine (FLEXERIL) 5 MG tablet; Take 1 tablet (5 mg) by mouth 2 times daily as needed for muscle spasms  -     Physical Therapy Referral; Future    Closed nondisplaced fracture of greater tuberosity of right humerus, initial encounter  -     Orthopedic  Referral  -     XR Shoulder Right G/E 3 Views; Future  -     cyclobenzaprine (FLEXERIL) 5 MG tablet; Take 1 tablet (5 mg) by mouth 2 times daily as needed for muscle spasms  -     Physical Therapy Referral; Future      This issue is acute and Unchanged.  We discussed these other possible diagnosis:  Greater tuberosity fracture, does have adhesive capsulitis as well    Plan:  - Today's Plan of Care:  Wean sling, continue range of motion exercises  [facility]hab[phone]  Cyclobenzaprine as needed at night (I reviewed the mechanism of action as well as risk/benefit profile of medications. Questions answered.)  Discussed limiting heavy lifting, overhead activities    -We also discussed other future treatment options:  Referral to Orthopedic Surgery    Follow Up: 1 month    Concerning signs and symptoms were reviewed.  The patient expressed understanding of this management plan and all questions were answered at this time.    Thanks for the opportunity to participate in the care of this patient, I will keep you updated on their progress.    CC: Gladys Isaacs MD [facility]  Sports Medicine Physician  [facility]      -----  Chief Complaint   Patient presents with     Right Shoulder - Pain       SUBJECTIVE  Ariadne Lowry is a/an 46 year old female who is seen in consultation at the request of  Gladys Paula C.N.P. for evaluation of right shoulder injury.     The patient is seen  "with their .  The patient is Right handed    Onset: 12/23/21 ~ 9 weeks ago. Patient describes injury as she slipped and fell landing on right shoulder.  Location of Pain: right posterior and lateral shoulder, right upper arm, right elbow, right forearm / wrist  Worsened by: movement / use of right arm, sling  Better with: chiropractic care provided some relief  Treatments tried: rest/activity avoidance, ice, heat, ibuprofen, other medications: Hydrocodone/Acetaminophen (Vicodin/Norco), previous imaging (MRI and xray) and topical analgesics, chiropractic care, home exercises, work restrictions (no lifting with right hand), sling  Associated symptoms: intermittent numbness and tingling in right hand, decreased ROM    Orthopedic/Surgical history: NO  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    REVIEW OF SYSTEMS:  Review of Systems  Skin: no bruising, no swelling  Musculoskeletal: as above  Neurologic: no numbness, paresthesias  Remainder of review of systems is negative including constitutional, CV, pulmonary, GI, except as noted in HPI or medical history.    OBJECTIVE:  BP (!) 162/88   Ht 1.702 m (5' 7\")   Wt 72.6 kg (160 lb)   BMI 25.06 kg/m     General: healthy, alert and in no distress  HEENT: no scleral icterus or conjunctival erythema  Skin: no suspicious lesions or rash. No jaundice.  CV: distal perfusion intact  Resp: normal respiratory effort without conversational dyspnea   Psych: normal mood and affect  Gait: normal steady gait with appropriate coordination and balance  Neuro: Normal light sensory exam of upper extremity    Bilateral Shoulder exam  Inspection and Posture:       rounded shoulders and upper back    Skin:        no visible deformities    Tender:        subacromial space right       Lateral shoulder    Non Tender:       remainder of shoulder bilateral    ROM:        forward flexion 60  right       abduction 60 " right       internal rotation minimal right       external rotation 35 right    Painful motions:       flexion right       elevation right    Strength:        abduction 4/5 right       flexion 4/5 right       internal rotation 4/5 right       external rotation 4/5 right    Sensation:        normal sensation over shoulder and upper extremity     RADIOLOGY:  I independently ordered, visualized and reviewed these images with the patient  3 XR views of right shoulder reviewed: some sclerosis at greater tuberosity fracture,  no significant degenerative change  - will follow official read    Reviewed MRI 2/21/2022 - non displaced greater tuberosity fracture, partial RC tear with tendinosis, possible adhesive capsulitis    Results for orders placed or performed during the hospital encounter of 02/20/22   MR Shoulder Right w/o Contrast    Narrative    EXAM: MR right shoulder without  contrast 2/21/2022 8:09 AM    TECHNIQUE: Multiplanar, multisequence imaging of the right shoulder  were obtained without administration of intravenous or intra-articular  gadolinium contrast using routine protocol.    History: Shoulder pain, rotator cuff disorder suspected, xray done;  Injury of right rotator cuff, subsequent encounter    Comparison: 12/23/2021    Findings:    ROTATOR CUFF and ASSOCIATED STRUCTURES  Rotator cuff:     On a background of tendinosis, moderate grade intrasubstance, likely  articular sided communicating tear of the supraspinatus posterior  fibers adjacent to the footprint involving approximately 3 mm in  anterior posterior dimension. Infraspinatus, teres minor tendons are  intact. Subscapularis tendinosis.    Bursa: Trace amount of fluid in the subacromial/subdeltoid bursal  fluid.    Musculature: Muscle bulk of rotator cuff is preserved.  Deltoid muscle  bulk is also preserved.  No muscle edema.    Acromioclavicular joint  There are mild degenerative changes of the acromioclavicular joint.  Acromion is type 2 in  sagittal morphology.  Coracoacromial ligament is  not thickened.    OSSEOUS STRUCTURES  Nondisplaced fracture of the proximal humerus involving the greater  tuberosity with extensive edema like marrow signal intensities.    LONG BICIPITAL TENDON  The long head of the biceps tendon is normally situated within the  bicipital groove. No complete or partial biceps tendon tear is  present.    GLENOHUMERAL JOINT  Joint fluid: Physiologic amount of joint fluid is  present.    Cartilage and subarticular bone:  No focal hyaline cartilage defects  are noted. No Hill-Sachs, reverse Hill-Sachs, or bony Bankart lesions  are seen.    Labrum: Limited assessment on this study with relative lack of joint  distention shows no labral tear.    ANCILLARY FINDINGS:  Partial effacement of the rotator interval fat and edema signal of the  axillary pouch. Constellation of these findings may be reflecting  changes of adhesive capsulitis.      Impression    Impression:  1. Nondisplaced greater tuberosity fracture.  2. On a background of tendinosis, moderate grade intrasubstance,  likely articular sided communicating tear of the supraspinatus  posterior fibers adjacent to the footprint involving approximately 3  mm in anterior posterior dimension.   3. Query clinical entity of adhesive capsulitis.    SALOMÓN MAGO         SYSTEM ID:  [id number]         Review of prior external note(s) from - UC and PCP  Review of the result(s) of each unique test - XR and MRI

## 2022-02-25 ENCOUNTER — ANCILLARY PROCEDURE (OUTPATIENT)
Dept: GENERAL RADIOLOGY | Facility: CLINIC | Age: 47
End: 2022-02-25
Attending: PEDIATRICS
Payer: COMMERCIAL

## 2022-02-25 ENCOUNTER — OFFICE VISIT (OUTPATIENT)
Dept: ORTHOPEDICS | Facility: CLINIC | Age: 47
End: 2022-02-25
Payer: COMMERCIAL

## 2022-02-25 VITALS
BODY MASS INDEX: 25.11 KG/M2 | WEIGHT: 160 LBS | HEIGHT: 67 IN | SYSTOLIC BLOOD PRESSURE: 162 MMHG | DIASTOLIC BLOOD PRESSURE: 88 MMHG

## 2022-02-25 DIAGNOSIS — S49.91XA INJURY OF RIGHT SHOULDER, INITIAL ENCOUNTER: Primary | ICD-10-CM

## 2022-02-25 DIAGNOSIS — S42.254A CLOSED NONDISPLACED FRACTURE OF GREATER TUBEROSITY OF RIGHT HUMERUS, INITIAL ENCOUNTER: ICD-10-CM

## 2022-02-25 DIAGNOSIS — S42.254G CLOSED NONDISPLACED FRACTURE OF GREATER TUBEROSITY OF RIGHT HUMERUS WITH DELAYED HEALING, SUBSEQUENT ENCOUNTER: ICD-10-CM

## 2022-02-25 DIAGNOSIS — M25.511 RIGHT SHOULDER PAIN: ICD-10-CM

## 2022-02-25 PROCEDURE — 73030 X-RAY EXAM OF SHOULDER: CPT | Mod: RT | Performed by: RADIOLOGY

## 2022-02-25 PROCEDURE — 99203 OFFICE O/P NEW LOW 30 MIN: CPT | Performed by: PEDIATRICS

## 2022-02-25 RX ORDER — CYCLOBENZAPRINE HCL 5 MG
5 TABLET ORAL 2 TIMES DAILY PRN
Qty: 30 TABLET | Refills: 0 | Status: SHIPPED | OUTPATIENT
Start: 2022-02-25 | End: 2022-03-30

## 2022-02-25 NOTE — PATIENT INSTRUCTIONS
We discussed these other possible diagnosis:  Greater tuberosity fracture, does have adhesive capsulitis as well    Plan:  - Today's Plan of Care:  Wean sling, continue range of motion exercises  Rehab[facility] [phone]  Cyclobenzaprine as needed at night (I reviewed the mechanism of action as well as risk/benefit profile of medications. Questions answered.)  Discussed limiting heavy lifting, overhead activities    -We also discussed other future treatment options:  Referral to Orthopedic Surgery    Follow Up: 1 month    If you have any further questions for your physician or physician s care team you can call [phone] and use option 3 to leave a voice message. Calls received during business hours will be returned same day.

## 2022-02-25 NOTE — LETTER
2/25/2022         RE: Ariadne Lwory  [address]  [facility] [address]        Dear Colleague,    Thank you for referring your patient, Ariadne Lowry, to the [facility] [place]. Please see a copy of my visit note below.    ASSESSMENT & PLAN    Ariadne was seen today for pain.    Diagnoses and all orders for this visit:    Injury of right shoulder, initial encounter  -     XR Shoulder Right G/E 3 Views; Future  -     cyclobenzaprine (FLEXERIL) 5 MG tablet; Take 1 tablet (5 mg) by mouth 2 times daily as needed for muscle spasms  -     Physical Therapy Referral; Future    Closed nondisplaced fracture of greater tuberosity of right humerus, initial encounter  -     Orthopedic  Referral  -     XR Shoulder Right G/E 3 Views; Future  -     cyclobenzaprine (FLEXERIL) 5 MG tablet; Take 1 tablet (5 mg) by mouth 2 times daily as needed for muscle spasms  -     Physical Therapy Referral; Future      This issue is acute and Unchanged.  We discussed these other possible diagnosis:  Greater tuberosity fracture, does have adhesive capsulitis as well    Plan:  - Today's Plan of Care:  Wean sling, continue range of motion exercises  Rehab: Physical Therapy: [facility] - [phone]  Cyclobenzaprine as needed at night (I reviewed the mechanism of action as well as risk/benefit profile of medications. Questions answered.)  Discussed limiting heavy lifting, overhead activities    -We also discussed other future treatment options:  Referral to Orthopedic Surgery    Follow Up: 1 month    Concerning signs and symptoms were reviewed.  The patient expressed understanding of this management plan and all questions were answered at this time.    Thanks for the opportunity to participate in the care of this patient, I will keep you updated on their progress.    CC: Gladys Isaacs MD [facility]  Sports Medicine Physician  [facility]      -----  Chief  "Complaint   Patient presents with     Right Shoulder - Pain       SUBJECTIVE  Ariadne Lowry is a/an 46 year old female who is seen in consultation at the request of  Gladys Paula C.N.P. for evaluation of right shoulder injury.     The patient is seen with their .  The patient is Right handed    Onset: 12/23/21 ~ 9 weeks ago. Patient describes injury as she slipped and fell landing on right shoulder.  Location of Pain: right posterior and lateral shoulder, right upper arm, right elbow, right forearm / wrist  Worsened by: movement / use of right arm, sling  Better with: chiropractic care provided some relief  Treatments tried: rest/activity avoidance, ice, heat, ibuprofen, other medications: Hydrocodone/Acetaminophen (Vicodin/Norco), previous imaging (MRI and xray) and topical analgesics, chiropractic care, home exercises, work restrictions (no lifting with right hand), sling  Associated symptoms: intermittent numbness and tingling in right hand, decreased ROM    Orthopedic/Surgical history: NO  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    REVIEW OF SYSTEMS:  Review of Systems  Skin: no bruising, no swelling  Musculoskeletal: as above  Neurologic: no numbness, paresthesias  Remainder of review of systems is negative including constitutional, CV, pulmonary, GI, except as noted in HPI or medical history.    OBJECTIVE:  BP (!) 162/88   Ht 1.702 m (5' 7\")   Wt 72.6 kg (160 lb)   BMI 25.06 kg/m     General: healthy, alert and in no distress  HEENT: no scleral icterus or conjunctival erythema  Skin: no suspicious lesions or rash. No jaundice.  CV: distal perfusion intact  Resp: normal respiratory effort without conversational dyspnea   Psych: normal mood and affect  Gait: normal steady gait with appropriate coordination and balance  Neuro: Normal light sensory exam of upper extremity    Bilateral Shoulder exam  Inspection and Posture:    "    rounded shoulders and upper back    Skin:        no visible deformities    Tender:        subacromial space right       Lateral shoulder    Non Tender:       remainder of shoulder bilateral    ROM:        forward flexion 60  right       abduction 60 right       internal rotation minimal right       external rotation 35 right    Painful motions:       flexion right       elevation right    Strength:        abduction 4/5 right       flexion 4/5 right       internal rotation 4/5 right       external rotation 4/5 right    Sensation:        normal sensation over shoulder and upper extremity     RADIOLOGY:  I independently ordered, visualized and reviewed these images with the patient  3 XR views of right shoulder reviewed: some sclerosis at greater tuberosity fracture,  no significant degenerative change  - will follow official read    Reviewed MRI 2/21/2022 - non displaced greater tuberosity fracture, partial RC tear with tendinosis, possible adhesive capsulitis    Results for orders placed or performed during the hospital encounter of 02/20/22   MR Shoulder Right w/o Contrast    Narrative    EXAM: MR right shoulder without  contrast 2/21/2022 8:09 AM    TECHNIQUE: Multiplanar, multisequence imaging of the right shoulder  were obtained without administration of intravenous or intra-articular  gadolinium contrast using routine protocol.    History: Shoulder pain, rotator cuff disorder suspected, xray done;  Injury of right rotator cuff, subsequent encounter    Comparison: 12/23/2021    Findings:    ROTATOR CUFF and ASSOCIATED STRUCTURES  Rotator cuff:     On a background of tendinosis, moderate grade intrasubstance, likely  articular sided communicating tear of the supraspinatus posterior  fibers adjacent to the footprint involving approximately 3 mm in  anterior posterior dimension. Infraspinatus, teres minor tendons are  intact. Subscapularis tendinosis.    Bursa: Trace amount of fluid in the subacromial/subdeltoid  bursal  fluid.    Musculature: Muscle bulk of rotator cuff is preserved.  Deltoid muscle  bulk is also preserved.  No muscle edema.    Acromioclavicular joint  There are mild degenerative changes of the acromioclavicular joint.  Acromion is type 2 in sagittal morphology.  Coracoacromial ligament is  not thickened.    OSSEOUS STRUCTURES  Nondisplaced fracture of the proximal humerus involving the greater  tuberosity with extensive edema like marrow signal intensities.    LONG BICIPITAL TENDON  The long head of the biceps tendon is normally situated within the  bicipital groove. No complete or partial biceps tendon tear is  present.    GLENOHUMERAL JOINT  Joint fluid: Physiologic amount of joint fluid is  present.    Cartilage and subarticular bone:  No focal hyaline cartilage defects  are noted. No Hill-Sachs, reverse Hill-Sachs, or bony Bankart lesions  are seen.    Labrum: Limited assessment on this study with relative lack of joint  distention shows no labral tear.    ANCILLARY FINDINGS:  Partial effacement of the rotator interval fat and edema signal of the  axillary pouch. Constellation of these findings may be reflecting  changes of adhesive capsulitis.      Impression    Impression:  1. Nondisplaced greater tuberosity fracture.  2. On a background of tendinosis, moderate grade intrasubstance,  likely articular sided communicating tear of the supraspinatus  posterior fibers adjacent to the footprint involving approximately 3  mm in anterior posterior dimension.   3. Query clinical entity of adhesive capsulitis.    SALOMÓN MAGO         SYSTEM ID:  [id number]         Review of prior external note(s) from - UC and PCP  Review of the result(s) of each unique test - XR and MRI             Again, thank you for allowing me to participate in the care of your patient.        Sincerely,        Mary Isaacs MD

## 2022-03-18 DIAGNOSIS — G43.909 MIGRAINE WITHOUT STATUS MIGRAINOSUS, NOT INTRACTABLE, UNSPECIFIED MIGRAINE TYPE: ICD-10-CM

## 2022-03-20 ENCOUNTER — HEALTH MAINTENANCE LETTER (OUTPATIENT)
Age: 47
End: 2022-03-20

## 2022-03-22 RX ORDER — SUMATRIPTAN 100 MG/1
TABLET, FILM COATED ORAL
Qty: 12 TABLET | Refills: 11 | Status: SHIPPED | OUTPATIENT
Start: 2022-03-22

## 2022-03-22 NOTE — TELEPHONE ENCOUNTER
"Requested Prescriptions   Pending Prescriptions Disp Refills     SUMAtriptan (IMITREX) 100 MG tablet [Pharmacy Med Name: SUMATRIPTAN SUCCINATE 100MG TABS] 12 tablet 11     Sig: TAKE ONE TABLET BY MOUTH AT ONSET OF HEADACHE FOR MIGRAINE, MAY REPEAT DOSE AFTER 2 HOURS IF NEEDED. DO NOT TAKE MORE THAN 200MG IN 24 HOURS. (NEED TO BE SEEN IN CLINIC FOR FURTHER REFILLS)       Serotonin Agonists Failed - 3/18/2022  9:49 AM        Failed - Blood pressure under 140/90 in past 12 months     BP Readings from Last 3 Encounters:   02/25/22 (!) 162/88   02/18/22 134/80   12/23/21 130/82                 Failed - Serotonin Agonist request needs review.     Please review patient's record. If patient has had 8 or more treatments in the past month, please forward to provider.          Passed - Recent (12 mo) or future (30 days) visit within the authorizing provider's specialty     Patient has had an office visit with the authorizing provider or a provider within the authorizing providers department within the previous 12 mos or has a future within next 30 days. See \"Patient Info\" tab in inbasket, or \"Choose Columns\" in Meds & Orders section of the refill encounter.              Passed - Medication is active on med list        Passed - Patient is age 18 or older        Passed - No active pregnancy on record        Passed - No positive pregnancy test in past 12 months             "

## 2022-03-30 ENCOUNTER — MYC REFILL (OUTPATIENT)
Dept: ORTHOPEDICS | Facility: CLINIC | Age: 47
End: 2022-03-30
Payer: COMMERCIAL

## 2022-03-30 DIAGNOSIS — S42.254A CLOSED NONDISPLACED FRACTURE OF GREATER TUBEROSITY OF RIGHT HUMERUS, INITIAL ENCOUNTER: ICD-10-CM

## 2022-03-30 DIAGNOSIS — S49.91XA INJURY OF RIGHT SHOULDER, INITIAL ENCOUNTER: ICD-10-CM

## 2022-03-31 RX ORDER — CYCLOBENZAPRINE HCL 5 MG
5 TABLET ORAL 2 TIMES DAILY PRN
Qty: 30 TABLET | Refills: 0 | Status: SHIPPED | OUTPATIENT
Start: 2022-03-31

## 2022-04-15 ENCOUNTER — OFFICE VISIT (OUTPATIENT)
Dept: ORTHOPEDICS | Facility: CLINIC | Age: 47
End: 2022-04-15
Payer: COMMERCIAL

## 2022-04-15 VITALS
SYSTOLIC BLOOD PRESSURE: 142 MMHG | WEIGHT: 160 LBS | DIASTOLIC BLOOD PRESSURE: 96 MMHG | BODY MASS INDEX: 25.11 KG/M2 | HEIGHT: 67 IN

## 2022-04-15 DIAGNOSIS — S42.254G CLOSED NONDISPLACED FRACTURE OF GREATER TUBEROSITY OF RIGHT HUMERUS WITH DELAYED HEALING, SUBSEQUENT ENCOUNTER: Primary | ICD-10-CM

## 2022-04-15 PROCEDURE — 99214 OFFICE O/P EST MOD 30 MIN: CPT | Performed by: PEDIATRICS

## 2022-04-15 NOTE — LETTER
4/15/2022         RE: Ariadne Lowry  [address]  [facility] [address]        Dear Colleague,    Thank you for referring your patient, Ariadne Lowry, to the [facility] [place]. Please see a copy of my visit note below.    ASSESSMENT & PLAN    Ariadne was seen today for pain.    Diagnoses and all orders for this visit:    Closed nondisplaced fracture of greater tuberosity of right humerus with delayed healing, subsequent encounter  -     Cancel: XR Wrist Right G/E 3 Views; Future  -     CT Shoulder Right w/o Contrast; Future      This issue is acute and Unchanged.    We discussed these other possible diagnosis: Given persistent tenderness, will obtain CT imaging to evaluate for fracture healing. Most symptoms likely from frozen shoulder.    Plan:  - Today's Plan of Care:  CT of the Right Shoulder - Call [phone] to schedule MRI  Discussed activity considerations and other supportive care including Ice/Heat, OTC and other topical medications as needed.  Continue Home Exercise Program    -We also discussed other future treatment options:  Consideration of US guided glenohumeral injection  Referral to Physical therapy  Referral to Orthopedic Surgery    Follow Up: will call with CT results    Concerning signs and symptoms were reviewed.  The patient expressed understanding of this management plan and all questions were answered at this time.    Mary Isaacs MD [facility]  Sports Medicine Physician  [facility]      SUBJECTIVE- Interim History April 15, 2022    Chief Complaint   Patient presents with     Right Upper Arm - Pain       Ariadne Lowry is a 46 year old female who is seen in f/u up for Closed nondisplaced fracture of greater tuberosity of right humerus with delayed healing, subsequent encounter. Since last visit on 2/25/22, patient has been feeling the same. Has been doing Home Exercise Program, did not schedule PT.  - Now ~ 12/23/21, 4 months from initial  "injury; falling on the ice, direct blow    Worsened by: any use of her arm, dressing, bathing, driving, flexion   Better with: resting/avoiding use, chiropractic care  Treatments tried: rest/activity avoidance, ice, heat, ibuprofen, other medications: Hydrocodone/Acetaminophen (Vicodin/Norco), previous imaging (MRI and xray) and topical analgesics, chiropractic care, home exercises (pendelum, wall crawl), work restrictions (no lifting with right hand), sling  Associated symptoms: intermittent numbness and tingling in right hand, decreased ROM     Orthopedic/Surgical history: NO  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    REVIEW OF SYSTEMS:  Review of Systems  Skin: no bruising, no swelling  Musculoskeletal: as above  Neurologic: no numbness, paresthesias  Remainder of review of systems is negative including constitutional, CV, pulmonary, GI, except as noted in HPI or medical history.    OBJECTIVE:  BP (!) 142/96   Ht 1.702 m (5' 7\")   Wt 72.6 kg (160 lb)   BMI 25.06 kg/m       General: healthy, alert and in no distress  HEENT: no scleral icterus or conjunctival erythema  Skin: no suspicious lesions or rash. No jaundice.  CV: distal perfusion intact  Resp: normal respiratory effort without conversational dyspnea   Psych: normal mood and affect  Gait: normal steady gait with appropriate coordination and balance  Neuro: Normal light sensory exam of upper extremity     Bilateral Shoulder exam  Inspection and Posture:       rounded shoulders and upper back     Skin:        no visible deformities     Tender:        subacromial space right       Lateral shoulder     Non Tender:       remainder of shoulder bilateral     ROM:        forward flexion 90  right       abduction 90 right       internal rotation minimal right       external rotation 35 right     Painful motions:       flexion right       elevation right     Strength:        abduction 4/5 " right       flexion 4/5 right       internal rotation 4/5 right       external rotation 4/5 right     Sensation:        normal sensation over shoulder and upper extremity    RADIOLOGY:  Final results and radiologist's interpretation, available in the [facility] health record.  Images were reviewed with the patient in the office today.  My personal interpretation of the performed imaging:    3 XR views of right shoulder reviewed: greater tuberosity fracture, less apparent on x-rays, no significant degenerative change  - will follow official read     Reviewed MRI 2/21/2022 - non displaced greater tuberosity fracture, partial RC tear with tendinosis, possible adhesive capsulitis    Review of the result(s) of each unique test - XR and MRI             Again, thank you for allowing me to participate in the care of your patient.        Sincerely,        Mary Isaacs MD

## 2022-04-15 NOTE — PROGRESS NOTES
ASSESSMENT & PLAN    Ariadne was seen today for pain.    Diagnoses and all orders for this visit:    Closed nondisplaced fracture of greater tuberosity of right humerus with delayed healing, subsequent encounter  -     Cancel: XR Wrist Right G/E 3 Views; Future  -     CT Shoulder Right w/o Contrast; Future      This issue is acute and Unchanged.    We discussed these other possible diagnosis: Given persistent tenderness, will obtain CT imaging to evaluate for fracture healing. Most symptoms likely from frozen shoulder.    Plan:  - Today's Plan of Care:  CT of the Right Shoulder - Call [phone] to schedule MRI  Discussed activity considerations and other supportive care including Ice/Heat, OTC and other topical medications as needed.  Continue Home Exercise Program    -We also discussed other future treatment options:  Consideration of US guided glenohumeral injection  Referral to Physical therapy  Referral to Orthopedic Surgery    Follow Up: will call with CT results    Concerning signs and symptoms were reviewed.  The patient expressed understanding of this management plan and all questions were answered at this time.    Mary Isaacs MD [facility]  Sports Medicine Physician  [facility]      SUBJECTIVE- Interim History April 15, 2022    Chief Complaint   Patient presents with     Right Upper Arm - Pain       Ariadne Lowry is a 46 year old female who is seen in f/u up for Closed nondisplaced fracture of greater tuberosity of right humerus with delayed healing, subsequent encounter. Since last visit on 2/25/22, patient has been feeling the same. Has been doing Home Exercise Program, did not schedule PT.  - Now ~ 12/23/21, 4 months from initial injury; falling on the ice, direct blow    Worsened by: any use of her arm, dressing, bathing, driving, flexion   Better with: resting/avoiding use, chiropractic care  Treatments tried: rest/activity avoidance, ice, heat, ibuprofen, other medications:  "Hydrocodone/Acetaminophen (Vicodin/[place]), previous imaging (MRI and xray) and topical analgesics, chiropractic care, home exercises (pendelum, wall crawl), work restrictions (no lifting with right hand), sling  Associated symptoms: intermittent numbness and tingling in right hand, decreased ROM     Orthopedic/Surgical history: NO  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    REVIEW OF SYSTEMS:  Review of Systems  Skin: no bruising, no swelling  Musculoskeletal: as above  Neurologic: no numbness, paresthesias  Remainder of review of systems is negative including constitutional, CV, pulmonary, GI, except as noted in HPI or medical history.    OBJECTIVE:  BP (!) 142/96   Ht 1.702 m (5' 7\")   Wt 72.6 kg (160 lb)   BMI 25.06 kg/m       General: healthy, alert and in no distress  HEENT: no scleral icterus or conjunctival erythema  Skin: no suspicious lesions or rash. No jaundice.  CV: distal perfusion intact  Resp: normal respiratory effort without conversational dyspnea   Psych: normal mood and affect  Gait: normal steady gait with appropriate coordination and balance  Neuro: Normal light sensory exam of upper extremity     Bilateral Shoulder exam  Inspection and Posture:       rounded shoulders and upper back     Skin:        no visible deformities     Tender:        subacromial space right       Lateral shoulder     Non Tender:       remainder of shoulder bilateral     ROM:        forward flexion 90  right       abduction 90 right       internal rotation minimal right       external rotation 35 right     Painful motions:       flexion right       elevation right     Strength:        abduction 4/5 right       flexion 4/5 right       internal rotation 4/5 right       external rotation 4/5 right     Sensation:        normal sensation over shoulder and upper extremity    RADIOLOGY:  Final results and radiologist's interpretation, available in the " [facility] record.  Images were reviewed with the patient in the office today.  My personal interpretation of the performed imaging:    3 XR views of right shoulder reviewed: greater tuberosity fracture, less apparent on x-rays, no significant degenerative change  - will follow official read     Reviewed MRI 2/21/2022 - non displaced greater tuberosity fracture, partial RC tear with tendinosis, possible adhesive capsulitis    Review of the result(s) of each unique test - XR and MRI

## 2022-04-15 NOTE — PATIENT INSTRUCTIONS
We discussed these other possible diagnosis: Given persistent tenderness, will obtain CT imaging to evaluate for fracture healing. Most symptoms likely from frozen shoulder.    Plan:  - Today's Plan of Care:  CT of the Right Shoulder - Call [phone] to schedule MRI  Discussed activity considerations and other supportive care including Ice/Heat, OTC and other topical medications as needed.  Continue Home Exercise Program    -We also discussed other future treatment options:  Consideration of US guided glenohumeral injection  Referral to Physical therapy  Referral to Orthopedic Surgery    Follow Up: will call with CT results    If you have any further questions for your physician or physician s care team you can call [phone] and use option 3 to leave a voice message. Calls received during business hours will be returned same day.

## 2022-04-16 ENCOUNTER — HOSPITAL ENCOUNTER (OUTPATIENT)
Dept: CT IMAGING | Facility: CLINIC | Age: 47
Discharge: HOME | End: 2022-04-16
Attending: PEDIATRICS | Admitting: PEDIATRICS
Payer: COMMERCIAL

## 2022-04-16 DIAGNOSIS — S42.254G CLOSED NONDISPLACED FRACTURE OF GREATER TUBEROSITY OF RIGHT HUMERUS WITH DELAYED HEALING, SUBSEQUENT ENCOUNTER: ICD-10-CM

## 2022-04-16 PROCEDURE — 73200 CT UPPER EXTREMITY W/O DYE: CPT | Mod: RT

## 2022-04-18 ENCOUNTER — MYC MEDICAL ADVICE (OUTPATIENT)
Dept: ORTHOPEDICS | Facility: CLINIC | Age: 47
End: 2022-04-18
Payer: COMMERCIAL

## 2022-04-18 DIAGNOSIS — S42.254G CLOSED NONDISPLACED FRACTURE OF GREATER TUBEROSITY OF RIGHT HUMERUS WITH DELAYED HEALING, SUBSEQUENT ENCOUNTER: Primary | ICD-10-CM

## 2022-04-18 NOTE — RESULT ENCOUNTER NOTE
Reviewed and notified of results via [employer].  Please place injection and PT referrals.    Scooby Ron,  Please see the results of your CT.  It appears the fracture has healed with mild posttraumatic deformity.  We had discussed US guided glenohumeral corticosteroid injection and physical therapy next step.  Let us know if you'd like these referrals, otherwise, we will review this in more detail at your follow-up appointment.   I would recommend follow up in clinic after 6 weeks of physical therapy to review your progress with consideration for orthopedic surgery referral if you are not improved at that point.  Let us know if you have additional questions.    Mary Isaacs MD, CAQ  Primary Care Sports Medicine  [facility]

## 2022-05-15 ENCOUNTER — HEALTH MAINTENANCE LETTER (OUTPATIENT)
Age: 47
End: 2022-05-15

## 2022-09-10 ENCOUNTER — HEALTH MAINTENANCE LETTER (OUTPATIENT)
Age: 47
End: 2022-09-10

## 2023-04-30 ENCOUNTER — HEALTH MAINTENANCE LETTER (OUTPATIENT)
Age: 48
End: 2023-04-30

## 2023-06-03 ENCOUNTER — HEALTH MAINTENANCE LETTER (OUTPATIENT)
Age: 48
End: 2023-06-03

## 2023-09-14 NOTE — PROGRESS NOTES
ASSESSMENT:   (G43.909) Migraine without status migrainosus, not intractable, unspecified migraine type  Comment: intermittent.  sumatriptan works well.  Has not been on preventative medication.   Plan: SUMAtriptan (IMITREX) 100 MG tablet,         amitriptyline (ELAVIL) 10 MG tablet          Refills on sumatriptan to use at onset of migraine.  May repeat in two hours if needed.  Maximum two pills in a day.   Try amitriptyline 10mg at bedtime.  Take on a daily basis for migraine prevention.  It can help for sleep and chronic pain.  If not helpful in the next month-increase to two pills at bedtime.  There is room to increase dose if needed.    Let me know if side effects, there are other medications we can try.        Mitul Ron is a 45 year old who presents for the following health issues   Chief Complaint   Patient presents with     Headache        Takes sumatriptan for migraine.  Consistently work well.  Takes second pill on occasion.   Makes her tired and woozy.   Triggering factors: before menses.  Tried oral contraceptive for prevention (had tubal).  Then she had one per month.  Stress triggers and neck pain (out of alignment).  Sees chiropractor.   She has photosensitivity.   Aura:Vision fuzzy around the edges.    Often wakes up with a migraine.    Sometimes hot showers and closing eyes helps.      Menses fluctuate in interval and duration.  Was q 28 days in the past for 5 days flow.    NO hot flashes.   Migraine     Since your last clinic visit, how have your headaches changed?  No change    How often are you getting headaches or migraines? 1-4 times per month     Are you able to do normal daily activities when you have a migraine? No    Are you taking rescue/relief medications? (Select all that apply) sumatriptan (Imitrex)    How helpful is your rescue/relief medication?  I get total relief sometimes has to take 2 -  but takes time (1-2:30 hours)    Are you taking any medications to prevent  Occupational Therapy    Patient not seen in therapy.     Occupational therapy orders received, patient with pelvic fractures. Discussed with nurse, awaiting MD to request ortho orders.  Will await ortho recommendations with weight bearing status.       OBJECTIVE                       Documented in the chart in the following areas: Assessment/Plan.      Therapy procedure time and total treatment time can be found documented on the Time Entry flowsheet   "migraines? (Select all that apply)  No    In the past 4 weeks, how often have you gone to urgent care or the emergency room because of your headaches?  0      How many servings of fruits and vegetables do you eat daily?  2-3    On average, how many sweetened beverages do you drink each day (Examples: soda, juice, sweet tea, etc.  Do NOT count diet or artificially sweetened beverages)?   0    How many days per week do you exercise enough to make your heart beat faster? 3 or less    How many minutes a day do you exercise enough to make your heart beat faster? 9 or less  How many days per week do you miss taking your medication? N/A    What makes it hard for you to take your medications?  N/A    Health OK in general.   sumatriptan is only medication.     Patient Active Problem List   Diagnosis     Cervical radicular pain     Migraine without status migrainosus, not intractable, unspecified migraine type     Bilateral carpal tunnel syndrome     Adjustment disorder with depressed mood      ROS:General: POSITIVE for:, does not sleep well with neck pain.   Resp: No coughing, wheezing or shortness of breath  CV: No chest pains or palpitations  GI: No nausea, vomiting,  heartburn, abdominal pain, diarrhea, constipation or change in bowel habits  : No urinary frequency or dysuria, bladder or kidney problems  Musculoskeletal: POSITIVE  for:, pain neck and shoulders.  .sees chiropractor.   Psychiatric: POSITIVE for:, stress, FATHER  A MONTH AGO.  Some estate squabbles.     OBJECTIVE:Blood pressure 122/68, pulse 96, temperature 99.1  F (37.3  C), resp. rate 18, height 1.702 m (5' 7\"), weight 72.1 kg (159 lb), last menstrual period 2021, SpO2 96 %, not currently breastfeeding. BMI=Body mass index is 24.9 kg/m .  GENERAL APPEARANCE ADULT: Alert, no acute distress       "

## 2024-07-07 ENCOUNTER — HEALTH MAINTENANCE LETTER (OUTPATIENT)
Age: 49
End: 2024-07-07

## 2025-05-11 ENCOUNTER — HEALTH MAINTENANCE LETTER (OUTPATIENT)
Age: 50
End: 2025-05-11